# Patient Record
Sex: MALE | Race: WHITE | Employment: OTHER | URBAN - METROPOLITAN AREA
[De-identification: names, ages, dates, MRNs, and addresses within clinical notes are randomized per-mention and may not be internally consistent; named-entity substitution may affect disease eponyms.]

---

## 2017-07-27 ENCOUNTER — OFFICE VISIT (OUTPATIENT)
Dept: ORTHOPEDIC SURGERY | Age: 57
End: 2017-07-27

## 2017-07-27 VITALS
BODY MASS INDEX: 40.25 KG/M2 | DIASTOLIC BLOOD PRESSURE: 63 MMHG | HEART RATE: 61 BPM | SYSTOLIC BLOOD PRESSURE: 123 MMHG | WEIGHT: 205.03 LBS | HEIGHT: 60 IN

## 2017-07-27 DIAGNOSIS — M17.11 PRIMARY OSTEOARTHRITIS OF RIGHT KNEE: Primary | ICD-10-CM

## 2017-07-27 DIAGNOSIS — M25.561 RIGHT KNEE PAIN, UNSPECIFIED CHRONICITY: ICD-10-CM

## 2017-07-27 PROCEDURE — 20610 DRAIN/INJ JOINT/BURSA W/O US: CPT | Performed by: ORTHOPAEDIC SURGERY

## 2017-07-27 PROCEDURE — 3017F COLORECTAL CA SCREEN DOC REV: CPT | Performed by: ORTHOPAEDIC SURGERY

## 2017-07-27 PROCEDURE — G8427 DOCREV CUR MEDS BY ELIG CLIN: HCPCS | Performed by: ORTHOPAEDIC SURGERY

## 2017-07-27 PROCEDURE — 1036F TOBACCO NON-USER: CPT | Performed by: ORTHOPAEDIC SURGERY

## 2017-07-27 PROCEDURE — 99214 OFFICE O/P EST MOD 30 MIN: CPT | Performed by: ORTHOPAEDIC SURGERY

## 2017-07-27 PROCEDURE — G8419 CALC BMI OUT NRM PARAM NOF/U: HCPCS | Performed by: ORTHOPAEDIC SURGERY

## 2017-07-27 PROCEDURE — 73564 X-RAY EXAM KNEE 4 OR MORE: CPT | Performed by: ORTHOPAEDIC SURGERY

## 2017-07-27 RX ORDER — ACETAMINOPHEN 500 MG
TABLET ORAL
Refills: 0 | COMMUNITY
Start: 2017-07-19 | End: 2019-05-21 | Stop reason: ALTCHOICE

## 2017-07-27 RX ORDER — GABAPENTIN 600 MG/1
TABLET ORAL
Refills: 6 | COMMUNITY
Start: 2017-07-19 | End: 2018-09-04 | Stop reason: ALTCHOICE

## 2017-07-27 RX ORDER — MELOXICAM 15 MG/1
15 TABLET ORAL DAILY
Qty: 30 TABLET | Refills: 3 | Status: SHIPPED | OUTPATIENT
Start: 2017-07-27 | End: 2019-02-19

## 2017-08-02 ENCOUNTER — OFFICE VISIT (OUTPATIENT)
Dept: ORTHOPEDIC SURGERY | Age: 57
End: 2017-08-02

## 2017-08-02 VITALS
BODY MASS INDEX: 40.25 KG/M2 | WEIGHT: 205.03 LBS | DIASTOLIC BLOOD PRESSURE: 61 MMHG | SYSTOLIC BLOOD PRESSURE: 115 MMHG | HEIGHT: 60 IN | HEART RATE: 72 BPM

## 2017-08-02 DIAGNOSIS — M79.672 PAIN OF LEFT HEEL: Primary | ICD-10-CM

## 2017-08-02 DIAGNOSIS — M72.2 BILATERAL PLANTAR FASCIITIS: ICD-10-CM

## 2017-08-02 DIAGNOSIS — M79.671 PAIN OF RIGHT HEEL: ICD-10-CM

## 2017-08-02 PROBLEM — H26.9 CATARACT: Status: ACTIVE | Noted: 2017-08-02

## 2017-08-02 PROBLEM — R20.9 DISTURBANCE OF SKIN SENSATION: Status: ACTIVE | Noted: 2017-08-02

## 2017-08-02 PROBLEM — M25.562 PAIN IN LEFT KNEE: Status: ACTIVE | Noted: 2017-08-02

## 2017-08-02 PROBLEM — L97.911 NON-PRESSURE CHRONIC ULCER OF RIGHT LOWER LEG, LIMITED TO BREAKDOWN OF SKIN (HCC): Status: ACTIVE | Noted: 2017-08-02

## 2017-08-02 PROBLEM — N18.9 CHRONIC KIDNEY DISEASE: Status: ACTIVE | Noted: 2017-08-02

## 2017-08-02 PROBLEM — R07.1 PAINFUL RESPIRATION: Status: ACTIVE | Noted: 2017-08-02

## 2017-08-02 PROCEDURE — L3040 FT ARCH SUPRT PREMOLD LONGIT: HCPCS | Performed by: PODIATRIST

## 2017-08-02 PROCEDURE — 3017F COLORECTAL CA SCREEN DOC REV: CPT | Performed by: PODIATRIST

## 2017-08-02 PROCEDURE — G8427 DOCREV CUR MEDS BY ELIG CLIN: HCPCS | Performed by: PODIATRIST

## 2017-08-02 PROCEDURE — 73650 X-RAY EXAM OF HEEL: CPT | Performed by: PODIATRIST

## 2017-08-02 PROCEDURE — 99214 OFFICE O/P EST MOD 30 MIN: CPT | Performed by: PODIATRIST

## 2017-08-02 PROCEDURE — G8417 CALC BMI ABV UP PARAM F/U: HCPCS | Performed by: PODIATRIST

## 2017-08-02 PROCEDURE — 1036F TOBACCO NON-USER: CPT | Performed by: PODIATRIST

## 2017-08-02 RX ORDER — LOSARTAN POTASSIUM 100 MG/1
100 TABLET ORAL DAILY
COMMUNITY
Start: 2017-07-31 | End: 2021-08-17

## 2017-08-02 RX ORDER — PREDNISONE 10 MG/1
TABLET ORAL
Qty: 26 TABLET | Refills: 0 | Status: SHIPPED | OUTPATIENT
Start: 2017-08-02 | End: 2018-09-04

## 2017-08-24 ENCOUNTER — TELEPHONE (OUTPATIENT)
Dept: ORTHOPEDIC SURGERY | Age: 57
End: 2017-08-24

## 2017-09-07 ENCOUNTER — OFFICE VISIT (OUTPATIENT)
Dept: ORTHOPEDIC SURGERY | Age: 57
End: 2017-09-07

## 2017-09-07 DIAGNOSIS — M17.12 PRIMARY OSTEOARTHRITIS OF LEFT KNEE: Primary | ICD-10-CM

## 2017-09-07 PROCEDURE — G8417 CALC BMI ABV UP PARAM F/U: HCPCS | Performed by: ORTHOPAEDIC SURGERY

## 2017-09-07 PROCEDURE — 20610 DRAIN/INJ JOINT/BURSA W/O US: CPT | Performed by: ORTHOPAEDIC SURGERY

## 2017-09-07 PROCEDURE — L1812 KO ELASTIC W/JOINTS PRE OTS: HCPCS | Performed by: ORTHOPAEDIC SURGERY

## 2017-09-07 PROCEDURE — 1036F TOBACCO NON-USER: CPT | Performed by: ORTHOPAEDIC SURGERY

## 2017-09-07 PROCEDURE — 3017F COLORECTAL CA SCREEN DOC REV: CPT | Performed by: ORTHOPAEDIC SURGERY

## 2017-09-07 PROCEDURE — 99213 OFFICE O/P EST LOW 20 MIN: CPT | Performed by: ORTHOPAEDIC SURGERY

## 2017-09-07 PROCEDURE — G8427 DOCREV CUR MEDS BY ELIG CLIN: HCPCS | Performed by: ORTHOPAEDIC SURGERY

## 2017-09-14 ENCOUNTER — OFFICE VISIT (OUTPATIENT)
Dept: ORTHOPEDIC SURGERY | Age: 57
End: 2017-09-14

## 2017-09-14 DIAGNOSIS — M17.12 PRIMARY OSTEOARTHRITIS OF LEFT KNEE: Primary | ICD-10-CM

## 2017-09-14 PROCEDURE — 20610 DRAIN/INJ JOINT/BURSA W/O US: CPT | Performed by: ORTHOPAEDIC SURGERY

## 2017-09-14 PROCEDURE — 1036F TOBACCO NON-USER: CPT | Performed by: ORTHOPAEDIC SURGERY

## 2017-09-28 ENCOUNTER — OFFICE VISIT (OUTPATIENT)
Dept: ORTHOPEDIC SURGERY | Age: 57
End: 2017-09-28

## 2017-09-28 DIAGNOSIS — M17.12 PRIMARY OSTEOARTHRITIS OF LEFT KNEE: Primary | ICD-10-CM

## 2017-09-28 PROCEDURE — 1036F TOBACCO NON-USER: CPT | Performed by: ORTHOPAEDIC SURGERY

## 2017-09-28 PROCEDURE — 20610 DRAIN/INJ JOINT/BURSA W/O US: CPT | Performed by: ORTHOPAEDIC SURGERY

## 2017-10-16 RX ORDER — ISOSORBIDE MONONITRATE 30 MG/1
TABLET, EXTENDED RELEASE ORAL
Qty: 30 TABLET | Refills: 0 | Status: SHIPPED | OUTPATIENT
Start: 2017-10-16 | End: 2017-10-21 | Stop reason: SDUPTHER

## 2017-10-23 RX ORDER — ISOSORBIDE MONONITRATE 30 MG/1
TABLET, EXTENDED RELEASE ORAL
Qty: 30 TABLET | Refills: 0 | Status: SHIPPED | OUTPATIENT
Start: 2017-10-23 | End: 2018-10-09 | Stop reason: SDUPTHER

## 2017-11-15 RX ORDER — ISOSORBIDE MONONITRATE 30 MG/1
TABLET, EXTENDED RELEASE ORAL
Qty: 30 TABLET | Refills: 0 | Status: SHIPPED | OUTPATIENT
Start: 2017-11-15 | End: 2018-01-11 | Stop reason: SDUPTHER

## 2018-01-11 RX ORDER — ISOSORBIDE MONONITRATE 30 MG/1
TABLET, EXTENDED RELEASE ORAL
Qty: 90 TABLET | Refills: 3 | Status: SHIPPED | OUTPATIENT
Start: 2018-01-11 | End: 2018-07-06 | Stop reason: SDUPTHER

## 2018-07-06 ENCOUNTER — OFFICE VISIT (OUTPATIENT)
Dept: CARDIOLOGY CLINIC | Age: 58
End: 2018-07-06

## 2018-07-06 VITALS
DIASTOLIC BLOOD PRESSURE: 60 MMHG | HEART RATE: 53 BPM | HEIGHT: 60 IN | SYSTOLIC BLOOD PRESSURE: 108 MMHG | WEIGHT: 197.4 LBS | OXYGEN SATURATION: 94 % | BODY MASS INDEX: 38.76 KG/M2

## 2018-07-06 DIAGNOSIS — R06.09 DOE (DYSPNEA ON EXERTION): ICD-10-CM

## 2018-07-06 DIAGNOSIS — R07.9 EXERTIONAL CHEST PAIN: Primary | ICD-10-CM

## 2018-07-06 DIAGNOSIS — R09.89 BRUIT (ARTERIAL): ICD-10-CM

## 2018-07-06 DIAGNOSIS — E78.2 MIXED HYPERLIPIDEMIA: ICD-10-CM

## 2018-07-06 PROCEDURE — 3017F COLORECTAL CA SCREEN DOC REV: CPT | Performed by: INTERNAL MEDICINE

## 2018-07-06 PROCEDURE — 93000 ELECTROCARDIOGRAM COMPLETE: CPT | Performed by: INTERNAL MEDICINE

## 2018-07-06 PROCEDURE — G8427 DOCREV CUR MEDS BY ELIG CLIN: HCPCS | Performed by: INTERNAL MEDICINE

## 2018-07-06 PROCEDURE — 99214 OFFICE O/P EST MOD 30 MIN: CPT | Performed by: INTERNAL MEDICINE

## 2018-07-06 PROCEDURE — G8417 CALC BMI ABV UP PARAM F/U: HCPCS | Performed by: INTERNAL MEDICINE

## 2018-07-06 PROCEDURE — 1036F TOBACCO NON-USER: CPT | Performed by: INTERNAL MEDICINE

## 2018-07-06 RX ORDER — GABAPENTIN 800 MG/1
800 TABLET ORAL 3 TIMES DAILY
COMMUNITY
Start: 2018-06-19 | End: 2019-02-19

## 2018-07-06 RX ORDER — PIOGLITAZONEHYDROCHLORIDE 45 MG/1
45 TABLET ORAL DAILY
COMMUNITY
Start: 2018-04-12 | End: 2019-05-21 | Stop reason: ALTCHOICE

## 2018-07-06 RX ORDER — SIMVASTATIN 40 MG
TABLET ORAL
COMMUNITY
Start: 2018-04-12

## 2018-07-06 RX ORDER — GLIPIZIDE 10 MG/1
10 TABLET ORAL DAILY
COMMUNITY
Start: 2018-05-25

## 2018-07-06 NOTE — PROGRESS NOTES
4' 11.06\" (1.5 m)   Wt 197 lb 6.4 oz (89.5 kg)   SpO2 94%   BMI 39.79 kg/m²      General Appearance:  Alert, cooperative, no distress, appears stated age       Head:  Normocephalic, without obvious abnormality, atraumatic   Eyes:  PERRL, conjunctiva/corneas clear       Nose: Nares normal, no drainage or sinus tenderness   Throat: Lips, mucosa, and tongue normal       Neck: Supple, symmetrical, trachea midline, no adenopathy, thyroid: not enlarged, symmetric, no tenderness/mass/nodules, no carotid bruit or JVD       Lungs:   Clear to auscultation bilaterally, respirations unlabored   Chest Wall:  No tenderness or deformity       Heart: Regular rhythm and normal rate; S1, S2 are normal; 2/6  ESTELA at base, apex and LSB; no rub or gallop, bilateral carotid bruit       Abdomen:   Soft, non-tender, bowel sounds active all four quadrants,  no masses, no organomegaly       Extremities: Extremities normal, atraumatic, no cyanosis or edema. No deformities of hands. Pulses: 2+ and symmetric       Skin: Skin color, texture, turgor normal, no rashes or lesions       Pysch: Normal mood and affect.  Alert and oriented x3        Neurologic: Normal gross motor and sensory exam.       DIAGNOSTIC WORK UP:  Lab Data:  CBC:   Lab Results   Component Value Date    WBC 7.1 03/24/2015    HGB 13.2 03/24/2015    HCT 38.0 03/24/2015    MCV 95.4 03/24/2015     03/24/2015     BMP:   Lab Results   Component Value Date     03/24/2015    K 4.0 03/24/2015     03/24/2015    CO2 26 03/24/2015    PHOS 2.9 01/18/2011    BUN 32 03/24/2015    CREATININE 1.4 03/24/2015    CALCIUM 9.0 03/24/2015    GFRAA >60 03/24/2015    GFRAA >60 01/18/2011     LFTS:   Lab Results   Component Value Date    ALT 14 07/26/2014    AST 18 07/26/2014    ALKPHOS 52 07/26/2014    PROT 7.2 07/26/2014    AGRATIO 1.4 07/26/2014    BILITOT 0.5 07/26/2014     PT/INR:   No components found for: PTPATIENT,  PTINR  LIPID PANEL:   No components found for: nitrates. His stress test was normal in Sept 2016. Hs pain is anginal. EKG done in office today showed sinus bradycardia with no ischemic ST changes. He has risk factors including strong family for premature CAD. He has high pretest probability for CAD and will proceed with cardiac cath for further risk stratification.  - he will continue low dose Asa and Zocor.  - I will see him back after the procedure. If you have questions, please do not hesitate to call me. I look forward to following Ronette Hammans along with you.       Nick Anderson; MD, [facility] - [place], [place]  [phone] Jan Appl office  [phone] Main central  7/6/2018 3:50 PM

## 2018-07-10 ENCOUNTER — TELEPHONE (OUTPATIENT)
Dept: CARDIOLOGY | Age: 58
End: 2018-07-10

## 2018-07-19 ENCOUNTER — HOSPITAL ENCOUNTER (OUTPATIENT)
Age: 58
Discharge: HOME | End: 2018-07-19
Payer: MEDICARE

## 2018-07-19 ENCOUNTER — HOSPITAL ENCOUNTER (OUTPATIENT)
Dept: GENERAL RADIOLOGY | Age: 58
Discharge: HOME | End: 2018-07-19
Payer: MEDICARE

## 2018-07-19 DIAGNOSIS — R06.09 DYSPNEA ON EXERTION: ICD-10-CM

## 2018-07-19 PROCEDURE — 71046 X-RAY EXAM CHEST 2 VIEWS: CPT

## 2018-09-04 ENCOUNTER — OFFICE VISIT (OUTPATIENT)
Dept: PULMONOLOGY | Age: 58
End: 2018-09-04

## 2018-09-04 VITALS
BODY MASS INDEX: 38.79 KG/M2 | HEIGHT: 60 IN | DIASTOLIC BLOOD PRESSURE: 60 MMHG | WEIGHT: 197.6 LBS | TEMPERATURE: 98.8 F | OXYGEN SATURATION: 96 % | RESPIRATION RATE: 20 BRPM | HEART RATE: 58 BPM | SYSTOLIC BLOOD PRESSURE: 95 MMHG

## 2018-09-04 DIAGNOSIS — R06.09 DOE (DYSPNEA ON EXERTION): Primary | ICD-10-CM

## 2018-09-04 DIAGNOSIS — E66.9 OBESITY (BMI 35.0-39.9 WITHOUT COMORBIDITY): ICD-10-CM

## 2018-09-04 PROCEDURE — 1036F TOBACCO NON-USER: CPT | Performed by: INTERNAL MEDICINE

## 2018-09-04 PROCEDURE — 99204 OFFICE O/P NEW MOD 45 MIN: CPT | Performed by: INTERNAL MEDICINE

## 2018-09-04 PROCEDURE — 3017F COLORECTAL CA SCREEN DOC REV: CPT | Performed by: INTERNAL MEDICINE

## 2018-09-04 PROCEDURE — G8417 CALC BMI ABV UP PARAM F/U: HCPCS | Performed by: INTERNAL MEDICINE

## 2018-09-04 PROCEDURE — G8427 DOCREV CUR MEDS BY ELIG CLIN: HCPCS | Performed by: INTERNAL MEDICINE

## 2018-09-04 NOTE — LETTER
[facility] Pulmonary, Critical Care, and Sleep  [address], [address] [place] [address]  Phone: [phone]  Fax: [phone]    September 4, 2018     Patient: Elise Westbrook   MR Number: [id number]   YOB: 1960   Date of Visit: 9/4/2018     Dear Dr. Winn Aver: Thank you for the request for consultation for [place] to me for the evaluation of SOB. Below are the relevant portions of my assessment and plan of care. ASSESSMENT:  · OVERTON: etiology unclear presently could include lung or heart or deconditioning contributions  · Obesity  · DM2  · TBI as a child     PLAN:   · PFTs/6MWT  · F/u for results     If you have questions, please do not hesitate to call me. I look forward to following Collin Ingram along with you.     Sincerely,      Blanca May MD

## 2018-10-09 ENCOUNTER — OFFICE VISIT (OUTPATIENT)
Dept: PULMONOLOGY | Age: 58
End: 2018-10-09
Payer: MEDICARE

## 2018-10-09 ENCOUNTER — HOSPITAL ENCOUNTER (OUTPATIENT)
Dept: PULMONOLOGY | Age: 58
Discharge: HOME | End: 2018-10-09
Payer: MEDICARE

## 2018-10-09 VITALS
HEIGHT: 60 IN | OXYGEN SATURATION: 97 % | SYSTOLIC BLOOD PRESSURE: 122 MMHG | DIASTOLIC BLOOD PRESSURE: 78 MMHG | BODY MASS INDEX: 38.72 KG/M2 | RESPIRATION RATE: 18 BRPM | WEIGHT: 197.2 LBS | HEART RATE: 59 BPM

## 2018-10-09 DIAGNOSIS — E66.9 OBESITY (BMI 35.0-39.9 WITHOUT COMORBIDITY): ICD-10-CM

## 2018-10-09 DIAGNOSIS — R06.09 DOE (DYSPNEA ON EXERTION): ICD-10-CM

## 2018-10-09 DIAGNOSIS — R06.09 DOE (DYSPNEA ON EXERTION): Primary | ICD-10-CM

## 2018-10-09 PROCEDURE — G8417 CALC BMI ABV UP PARAM F/U: HCPCS | Performed by: INTERNAL MEDICINE

## 2018-10-09 PROCEDURE — 6360000002 HC RX W HCPCS: Performed by: INTERNAL MEDICINE

## 2018-10-09 PROCEDURE — 94729 DIFFUSING CAPACITY: CPT

## 2018-10-09 PROCEDURE — 94726 PLETHYSMOGRAPHY LUNG VOLUMES: CPT

## 2018-10-09 PROCEDURE — 1036F TOBACCO NON-USER: CPT | Performed by: INTERNAL MEDICINE

## 2018-10-09 PROCEDURE — 94664 DEMO&/EVAL PT USE INHALER: CPT

## 2018-10-09 PROCEDURE — G8484 FLU IMMUNIZE NO ADMIN: HCPCS | Performed by: INTERNAL MEDICINE

## 2018-10-09 PROCEDURE — 94640 AIRWAY INHALATION TREATMENT: CPT

## 2018-10-09 PROCEDURE — 3017F COLORECTAL CA SCREEN DOC REV: CPT | Performed by: INTERNAL MEDICINE

## 2018-10-09 PROCEDURE — 94618 PULMONARY STRESS TESTING: CPT

## 2018-10-09 PROCEDURE — 94060 EVALUATION OF WHEEZING: CPT

## 2018-10-09 PROCEDURE — G8427 DOCREV CUR MEDS BY ELIG CLIN: HCPCS | Performed by: INTERNAL MEDICINE

## 2018-10-09 PROCEDURE — 99213 OFFICE O/P EST LOW 20 MIN: CPT | Performed by: INTERNAL MEDICINE

## 2018-10-09 RX ORDER — ALBUTEROL SULFATE 2.5 MG/3ML
2.5 SOLUTION RESPIRATORY (INHALATION) ONCE
Status: COMPLETED | OUTPATIENT
Start: 2018-10-09 | End: 2018-10-09

## 2018-10-09 RX ADMIN — ALBUTEROL SULFATE 2.5 MG: 2.5 SOLUTION RESPIRATORY (INHALATION) at 11:38

## 2018-10-09 NOTE — PROCEDURES
[address]                [address] [address]                              PULMONARY FUNCTION    PATIENT NAME: Estelita Saul                     :         1960  MED REC NO:   [id number]                          ROOM:  ACCOUNT NO:   [de-identified]                           ADMIT DATE:  2018  PROVIDER:     Phillip Avila MD    DATE OF PROCEDURE:  10/09/2018    INDICATION: Dyspnea on exertion    FINDINGS:  1. Spirometry revealed  no evidence of obstructive defect. FEV1 is  2.91 liters, which is 118% of predicted. No significant response to  bronchodilators. FEV1/FVC ratio of 82%. 2.  Lung volume revealed normal total lung capacity 5.09 liters, which  is 96% of predicted. 3.  Diffusion capacity is moderately diffused at 18.54, which is 77%  of predicted. 4.  Flow volume loops appeared to be normal.    5.  A six-minute walk was done per [facility] protocol. The patient was able to walk 988 feet. Saturation on room air at rest  was 93% with a heart rate of 60. No significant desaturation on  exertion. Max heart rate 93. CONCLUSION:  1. No evidence of obstructive defect or restrictive defect. 2.  No bronchodilator response. 3.  Isolated mildly diffused diffusion capacity. 4.  A six minute walk with no significant desaturation.         Mercedez Wick MD    D: 10/09/2018 12:23:45       T: 10/09/2018 13:16:22     [email or web address]  Job#: [id number]     Doc#: [id number]    CC:

## 2018-10-10 RX ORDER — ISOSORBIDE MONONITRATE 30 MG/1
TABLET, EXTENDED RELEASE ORAL
Qty: 30 TABLET | Refills: 0 | Status: SHIPPED | OUTPATIENT
Start: 2018-10-10 | End: 2019-02-07 | Stop reason: SDUPTHER

## 2019-02-09 RX ORDER — ISOSORBIDE MONONITRATE 30 MG/1
TABLET, EXTENDED RELEASE ORAL
Qty: 15 TABLET | Refills: 0 | Status: SHIPPED | OUTPATIENT
Start: 2019-02-09 | End: 2019-03-03 | Stop reason: SDUPTHER

## 2019-02-19 ENCOUNTER — OFFICE VISIT (OUTPATIENT)
Dept: ORTHOPEDIC SURGERY | Age: 59
End: 2019-02-19
Payer: MEDICARE

## 2019-02-19 VITALS
BODY MASS INDEX: 38.69 KG/M2 | HEIGHT: 60 IN | WEIGHT: 197.09 LBS | DIASTOLIC BLOOD PRESSURE: 65 MMHG | SYSTOLIC BLOOD PRESSURE: 114 MMHG | HEART RATE: 58 BPM

## 2019-02-19 DIAGNOSIS — M17.12 PRIMARY OSTEOARTHRITIS OF LEFT KNEE: ICD-10-CM

## 2019-02-19 DIAGNOSIS — M25.562 ACUTE PAIN OF BOTH KNEES: Primary | ICD-10-CM

## 2019-02-19 DIAGNOSIS — M25.561 ACUTE PAIN OF BOTH KNEES: Primary | ICD-10-CM

## 2019-02-19 PROCEDURE — G8417 CALC BMI ABV UP PARAM F/U: HCPCS | Performed by: ORTHOPAEDIC SURGERY

## 2019-02-19 PROCEDURE — 99213 OFFICE O/P EST LOW 20 MIN: CPT | Performed by: ORTHOPAEDIC SURGERY

## 2019-02-19 PROCEDURE — G8427 DOCREV CUR MEDS BY ELIG CLIN: HCPCS | Performed by: ORTHOPAEDIC SURGERY

## 2019-02-19 PROCEDURE — 1036F TOBACCO NON-USER: CPT | Performed by: ORTHOPAEDIC SURGERY

## 2019-02-19 PROCEDURE — 3017F COLORECTAL CA SCREEN DOC REV: CPT | Performed by: ORTHOPAEDIC SURGERY

## 2019-02-19 PROCEDURE — G8484 FLU IMMUNIZE NO ADMIN: HCPCS | Performed by: ORTHOPAEDIC SURGERY

## 2019-02-19 RX ORDER — PENICILLIN V POTASSIUM 500 MG/1
TABLET ORAL
COMMUNITY
Start: 2019-02-18 | End: 2019-05-21

## 2019-02-27 ENCOUNTER — HOSPITAL ENCOUNTER (OUTPATIENT)
Dept: MRI IMAGING | Age: 59
Discharge: HOME | End: 2019-02-27
Payer: MEDICARE

## 2019-02-27 DIAGNOSIS — M25.562 ACUTE PAIN OF BOTH KNEES: ICD-10-CM

## 2019-02-27 DIAGNOSIS — M25.561 ACUTE PAIN OF BOTH KNEES: ICD-10-CM

## 2019-02-27 PROCEDURE — 73721 MRI JNT OF LWR EXTRE W/O DYE: CPT

## 2019-03-05 RX ORDER — ISOSORBIDE MONONITRATE 30 MG/1
TABLET, EXTENDED RELEASE ORAL
Qty: 10 TABLET | Refills: 0 | Status: SHIPPED | OUTPATIENT
Start: 2019-03-05 | End: 2021-08-17

## 2019-03-25 ENCOUNTER — OFFICE VISIT (OUTPATIENT)
Dept: ORTHOPEDIC SURGERY | Age: 59
End: 2019-03-25
Payer: MEDICARE

## 2019-03-25 DIAGNOSIS — M17.12 PRIMARY OSTEOARTHRITIS OF LEFT KNEE: Primary | ICD-10-CM

## 2019-03-25 PROCEDURE — 99213 OFFICE O/P EST LOW 20 MIN: CPT | Performed by: ORTHOPAEDIC SURGERY

## 2019-03-25 PROCEDURE — G8417 CALC BMI ABV UP PARAM F/U: HCPCS | Performed by: ORTHOPAEDIC SURGERY

## 2019-03-25 PROCEDURE — 3017F COLORECTAL CA SCREEN DOC REV: CPT | Performed by: ORTHOPAEDIC SURGERY

## 2019-03-25 PROCEDURE — G8427 DOCREV CUR MEDS BY ELIG CLIN: HCPCS | Performed by: ORTHOPAEDIC SURGERY

## 2019-03-25 PROCEDURE — G8484 FLU IMMUNIZE NO ADMIN: HCPCS | Performed by: ORTHOPAEDIC SURGERY

## 2019-03-25 PROCEDURE — 1036F TOBACCO NON-USER: CPT | Performed by: ORTHOPAEDIC SURGERY

## 2019-04-22 ENCOUNTER — OFFICE VISIT (OUTPATIENT)
Dept: ORTHOPEDIC SURGERY | Age: 59
End: 2019-04-22
Payer: MEDICARE

## 2019-04-22 VITALS — HEIGHT: 60 IN | WEIGHT: 184 LBS | BODY MASS INDEX: 36.12 KG/M2

## 2019-04-22 DIAGNOSIS — M17.12 PRIMARY OSTEOARTHRITIS OF LEFT KNEE: Primary | ICD-10-CM

## 2019-04-22 DIAGNOSIS — M25.562 LEFT KNEE PAIN, UNSPECIFIED CHRONICITY: ICD-10-CM

## 2019-04-22 PROCEDURE — 1036F TOBACCO NON-USER: CPT | Performed by: ORTHOPAEDIC SURGERY

## 2019-04-22 PROCEDURE — G8427 DOCREV CUR MEDS BY ELIG CLIN: HCPCS | Performed by: ORTHOPAEDIC SURGERY

## 2019-04-22 PROCEDURE — 3017F COLORECTAL CA SCREEN DOC REV: CPT | Performed by: ORTHOPAEDIC SURGERY

## 2019-04-22 PROCEDURE — L1830 KO IMMOB CANVAS LONG PRE OTS: HCPCS | Performed by: ORTHOPAEDIC SURGERY

## 2019-04-22 PROCEDURE — G8417 CALC BMI ABV UP PARAM F/U: HCPCS | Performed by: ORTHOPAEDIC SURGERY

## 2019-04-22 PROCEDURE — 99214 OFFICE O/P EST MOD 30 MIN: CPT | Performed by: ORTHOPAEDIC SURGERY

## 2019-04-22 NOTE — LETTER
Attention    These are medical screening labs, not pre-admission surgery labs. Via Janes Ross M.D.  [phone]  [address] [facility], [address] [facility]    Date:  2019    Name: Martha Berry    : 1960    Please take this form with you.       THE FOLLOWING LABS NEED TO BE COMPLETED:    _x__UA  _x__URINE C/S (THIS NEEDS TO BE DONE EVEN IF THE UA IS NORMAL)  _x__CBC W/ DIFF  _x__PT/INR  _x__PTT  _x__TRANSFERRIN  _x__ALBUMIN  _x__CHEM 7  _x__HEMAGLOBIN A1-C  ____OTHER: _____________________________________________                         Diagnosis:  LT KNEE OSTEOARTHRITIS            RT KNEE OA M17.11      LT KNEE OA M17.12         RT HIP OA  M16.11         LT HIP OA M16.12         RT SHLD OA  M19.011   LT SHLD OA  M19.012             Z01.812  (Pre-op examination code)      2019 11:20 AM  Radha Napier PA-C

## 2019-04-22 NOTE — PROGRESS NOTES
2015    CATARACT REMOVAL WITH IMPLANT Left 8/12/15    FRACTURE SURGERY      RIGHT ARM AS A CHILD    OTHER SURGICAL HISTORY  3/24/15    Excision Soft Tissue Mass on Forehead     Family History   Problem Relation Age of Onset    Diabetes Mother     Cancer Father         COLON CA     Social History     Socioeconomic History    Marital status: Single     Spouse name: None    Number of children: None    Years of education: None    Highest education level: None   Occupational History    None   Social Needs    Financial resource strain: None    Food insecurity:     Worry: None     Inability: None    Transportation needs:     Medical: None     Non-medical: None   Tobacco Use    Smoking status: Former Smoker     Packs/day: 2.00     Years: 20.00     Pack years: 40.00     Types: Cigarettes     Last attempt to quit: 1990     Years since quittin.3    Smokeless tobacco: Never Used   Substance and Sexual Activity    Alcohol use: No    Drug use: No    Sexual activity: Yes     Partners: Female   Lifestyle    Physical activity:     Days per week: None     Minutes per session: None    Stress: None   Relationships    Social connections:     Talks on phone: None     Gets together: None     Attends Sikh service: None     Active member of club or organization: None     Attends meetings of clubs or organizations: None     Relationship status: None    Intimate partner violence:     Fear of current or ex partner: None     Emotionally abused: None     Physically abused: None     Forced sexual activity: None   Other Topics Concern    None   Social History Narrative    None     Current Outpatient Medications   Medication Sig Dispense Refill    isosorbide mononitrate (IMDUR) 30 MG extended release tablet TAKE 1 TABLET BY MOUTH ONE TIME A DAY. needs appointment with office for further refills.   10 tablet 0    penicillin v potassium (VEETID) 500 MG tablet       metFORMIN (GLUCOPHAGE) 500 MG tablet Take 500 mg by mouth 2 times daily (with meals)       glipiZIDE (GLUCOTROL) 10 MG tablet Take 10 mg by mouth daily       pioglitazone (ACTOS) 45 MG tablet Take 45 mg by mouth daily       simvastatin (ZOCOR) 40 MG tablet       losartan (COZAAR) 100 MG tablet Take 100 mg by mouth daily       MAPAP 500 MG tablet TAKE 1 TABLET BY MOUTH EVERY EIGHT HOURS AS NEEDED  0    ibuprofen (ADVIL;MOTRIN) 600 MG tablet Take 1 tablet by mouth every 6 hours as needed for Pain 20 tablet 0    aspirin 81 MG EC tablet Take 81 mg by mouth daily. No current facility-administered medications for this visit. Medication Management  Patient has been treated with NSAIDs and Steroids with Minimal relief for 2 weeks. Review of Systems:  Relevant review of systems reviewed and available in the patient's chart    Vital Signs: There were no vitals filed for this visit. Body mass index is 35.94 kg/m². Limitation in Activities of Daily Living (ADLs)  The patient is able to ambulate with the assistance of cane for 6 - 10 steps  steps/feet. Walking distance less than 1 block    Patient needs assistance with activities of daily living bathing and cooking. [facility]: No     Safety Issues: Risk of falls  Patient is at risk for falls/fall history: Yes    General Exam:   Constitutional: Patient is adequately groomed with no evidence of malnutrition  DTRs: Deep tendon reflexes are intact  Mental Status: The patient is oriented to time, place and person. The patient's mood and affect are appropriate. Lymphatic: The lymphatic examination bilaterally reveals all areas to be without enlargement or induration. Vascular: Examination reveals no swelling or calf tenderness. Peripheral pulses are palpable and 2+. Neurological: The patient has good coordination. There is no weakness or sensory deficit. Left Knee Examination:    Inspection:  No erythema or signs of infection.   Positive weight loss, and activity modification for years with no improvement in  pain relief or function. Impression:  Encounter Diagnoses   Name Primary?  Left knee pain, unspecified chronicity     Primary osteoarthritis of left knee Yes       Office Procedures:  Orders Placed This Encounter   Procedures    XR KNEE LEFT (1-2 VIEWS)     Standing Status:   Future     Number of Occurrences:   1     Standing Expiration Date:   5/22/2019   Roseanna Kayser [facility] Physical Therapy     Referral Priority:   Routine     Referral Type:   Eval and Treat     Referral Reason:   Specialty Services Required     Requested Specialty:   Physical Therapy     Number of Visits Requested:   1       Treatment Plan:  I discussed with the patient the nature of osteoarthritis of the knee. We talked about treatment of arthritis and the various options that are involved with this. The patient understands that the treatments can vary from essentially doing nothing to a total joint replacement arthroplasty for arthritis. I then went on to describe the utilization of glucosamine and chondroitin sulfate as a joint nutrition product. We talked about the fact that this is essentially a joint vitamin with typically minimal side effects. We also talked about utilization of prescription over-the-counter anti-inflammatory medications as the next option. We also discussed the possibility of brace wear or orthotic wear if the patient has significant varus alignment. We then went on to discuss the possibility of Visco supplementation with hyaluronate products. We talked about the typical course of this type of treatment and the fact that often times in the treatment for significant arthritis, this is successful less than half the time.  We also talked about the corticosteroid injections and the fact that this can give a brief window of relief, but does not cure the problem; in fact, the pain often has a rebound effect in 6-10 weeks after the steroid has worn off. We also discussed arthroscopy surgery in attempts to debride the joint, but the fact that this is relatively unreliable treatment in the face of significant arthritis. It can occasionally be used, particularly if there is significant meniscus pathology. Lastly we discussed total joint replacement arthroplasty as the final and definitive step in treatment of arthritis. Patient realizes the magnitude of this type of treatment as well as having voiced a general understanding to the duration of the prosthesis. The patient voiced understanding to these continuum of treatment options. At this point the patient has failed conservative treatment as mentioned above. They would like to go ahead and proceed with a left total knee replacement with robotic assistance. This does require nondiagnostic CT scan for surgical planning. The patient is aware that this may or may not be covered by the insurance provider and would be an out-of-pocket expense. Patient will now participate in preoperative laboratory evaluation and physical therapy. Patient will participate in Orthovitals postoperatively. Patient is aware that he will need a history of physical within 30 days of the procedure. The patient is a diabetic with his last hemoglobin A1c of 5.9. We discussed the risk, benefits, and potential complications of knee replacement arthroplasty surgery. The patient voiced their understanding to concerns that include infection, deep vein thrombosis, neurological injury, and delayed rehabilitation. The patient also realizes that there are concerns regarding the potential need for manipulation under anesthesia if range of motion proves to be problematic. The patient also understands that there is always a chance of dystrophy and anesthetic complications that would include a stroke, cardiopulmonary pathology, and even death.   We also discussed the rehabilitation process involved with this operation and options that involved not only the hospitalization but outpatient physical therapy and independent home exercise program.  The patient also realizes the need for a knee brace and ambulatory aids in the rehabilitation process as well as the very significant role that the patient plays in terms of rehabilitation after this type of operation. The patient also understands that although the patient typically functional by 6-8 weeks postop that it may take 9 months to year for full recovery. All questions were answered.

## 2019-04-22 NOTE — LETTER
CONSENT TO SURGICAL OR MEDICAL PROCEDURE    PATIENTS NAMES: Jez Jackson 1960  59 y.o. [phone] (home)   DATE/TIME: 4/22/2019 11:19 AM    1) I consent that Dr. Meredith Castro perform one or more surgical and or medical procedures on the above named patient at: [address] [place] [address]/[facility] to treat the condition(s) which appear indicated by the diagnostic studies already preformed. I have been told in general terms the nature and purpose of the procedure(s) and what the procedure(s) is/are expected to accomplish. They procedure(s) are as follows:   LT TOTAL KNEE MAKOPLASTY     2) It has been explained to me by the informing physician that during the course of any surgical or medical procedure unforeseen condition(s) may be revealed that necessitate an extension of the original procedure(s) or a different procedure(s) than set forth in Paragraph 1. I therefore consent that the above named physician perform such additional or different procedure(s) as are necessary or desirable in the exercise of his professional judgement. 3) I have been made aware by the informing physician of certain reasonably known risks that are associated with the procedure(s) set forth in Paragraph 1.  I understand the reasonably known risk to be: Including but not limited to: CVA, infection, M.I., Phlebitis, Cardiac Arrest and Pulmonary Embolism, Loss of Circulation, Nerve Injury, Delayed Healing, Recurrence, Loss of extremity/digit, R.S.D., Screw breakage, Arthritis, Pain, Swelling, Stiffness, Failure of Prothesis, Fracture, Leg length discrepancy, Wound complication/non-healing, need for further surgery and persistent pain. 4) I have also been informed by the informing physician that there are other risks, from both known and unknown causes, that are attendant to the performance of any surgical or medical procedure(s).   I am aware that the practice of surgery and medicine is not an exact science, and I acknowledge that no guarantees have been made to me concerning the results of the procedure(s). 5) I consent to the taking of photographs before, during and after the procedure(s) for scientific and educational purposes. I also understand that medical students and residents may participate in the procedure(s) set forth in Paragraph 1, and I consent to their participation under the supervision of the above named physician. 6) I consent to the administration of anesthesia and to the use of such anesthetics as may be deemed advisable by the anesthesiologist engaged to administer anesthesia. 7) I certify that I have read and understand this consent to the surgical or medical procedure(s); that all the information contained herein was disclosed to me by the informing physician prior to my signing; that all blanks or statements requiring insertions or completion were filled in and inapplicable paragraphs, if any, were stricken before I signed; and that all questions asked by me about the procedure(s) have been fully answered by the informing physician in a satisfactory manner.    ________________________________                           _______________________________  Signature of patient                                                                  Mike Gillis M.D.  ________________________________                           ________________________________  Signature of Informing Physician                                           Informing Physician (Print)    If patient is unable to sign or is a minor, complete one of the following:   A) Patient is a minor ______________ years of age.    B) Patient is unable to sign because_________________________________________________ The undersigned represents that he or she is duly authorized to execute to this consent for and on the behalf of the above named patient.    ________________________________             __________________________________________  Witness                                                                         Parent/Spouse/Guardian/Other:_________________    Medical Record#  Insurance  Smartphone:  Yes   Or   No  Email:                 You have signed a consent to have a total joint replacement surgery performed. Before you can proceed with surgery the following things must be done. Please use this form as a checklist.      _____   Please schedule your Physical Therapy functional evaluation. At the location on your script.    _____   Please take your lab orders and get your blood work done at a [facility].    _____  Royce Ortiz will need to go to [facility] to complete registration and the medical questionnaire prior to your physical therapy evaluation. Do not schedule an appointment with your primary care physician until you have a surgery date. This pre-op exam has to be within 30 days of the surgery. _____  CT Scans will be scheduled by our office.    _____  Information about the pre-op class will be in your surgery packet that will be mailed to you after you are scheduled for surgery. Once you have completed both the labs and the evaluation please call Lizette Carroll @ [phone] to let her know. Once verification of the PT Evaluation and completed labs has been determined you will be called and set up for surgery. This may take 1-2 days to check results and return your phone call.

## 2019-04-29 DIAGNOSIS — M25.562 LEFT KNEE PAIN, UNSPECIFIED CHRONICITY: Primary | ICD-10-CM

## 2019-05-02 ENCOUNTER — TELEPHONE (OUTPATIENT)
Dept: ORTHOPEDIC SURGERY | Age: 59
End: 2019-05-02

## 2019-05-02 NOTE — TELEPHONE ENCOUNTER
ORTHOPAEDIC NURSE NAVIGATOR SUMMARY NOTE  Sister Jennifer answered all questions for pt. States he doesn't understand a lot of medical things. He doesn't read or write. Anticipated Date of Surgery: 5/28/19    Using [facility]oVitals? Yes, Are they Registered:  yes   If No, why not? N/A    Attended Pre-Op Education Class: no   If No, why not? Pt Declined      PCP: ANTONIO Parker CNP   Phone #: [phone]    Date of PCP Visit for H&P:5/20/19    Any Noted Concerns from PCP prior to surgery:  No   If Yes, what concerns? :- pending cardiac clearance        IS THE PATIENT IN A PAIN MANAGEMENT PROGRAM?:   Not Applicable         Review of Past Medical History Reveals History of:      Critical Lab Values:   Hgb/Hct:   Hemoglobin (g/dL)   Date Value   03/24/2015 13.2 (L)   /  Hematocrit (%)   Date Value   03/24/2015 38.0 (L)      HgbA1C:    Lab Results   Component Value Date    LABA1C 5.9 07/26/2014      Albumin:    Lab Results   Component Value Date    LABALBU 4.2 07/26/2014      BUN/Cr:   BUN (mg/dL)   Date Value   03/24/2015 32 (H)   /  CREATININE (mg/dL)   Date Value   03/24/2015 1.4 (H)      BMI:    BMI Readings from Last 1 Encounters:   04/22/19 35.94 kg/m²        Coronary Artery Disease/HTN/CHF History: Yes- HTN      Cardiologist: Dr. Hamzah Avery    Cardiac Clearance Necessary:Yes    Date of Cardiac Clearance Appt: 5/22/19- echo, stress test, and myoscan all normal.     On Plavix? No,  If YES, when will they stop taking?     Final Cardiac Recommendations:N/A   -On any anticoagulation-NONE       Diabetes History: Yes    Most Recent HgbA1C: 5.9    PCP or Endocrine Recommendations: N/A    Nutritionist/Dietician Consult Scheduled: N/A    Final Plan For Diabetic Control: N/A   Pulmonary: COPD/Emphysema/ Use of home oxygen: NONE     Alcohol use:NONE           DVT Risk Stratification:  Unknown      Vascular Consult Ordered:  NA    Date of Vascular Appt:     Hematology/Oncology Consult Ordered:  NA    Date of Hematology/Oncology Appt:     Final Recommendation For DVT Prophylaxis:   -Smoking history or use of estrogen-Quit 30 years ago. BMI Greater than 40 at time of scheduling?: No    Has Surgeon been notified of BMI concern? Not Applicable    Weight Loss Clinic Consult Ordered: Not Applicable    Date of Wt Loss Clinic Appt:     BMI at time of surgery (if went through [facility]): Additional Medical Concerns:     None    Additional Recommendations for above concerns:      Discharge Disposition Information:     Attended Pre-Hab Program: no to schedule      Anticipated Discharge Disposition:  Home    Who will be with patient at home following discharge? Pt to stay with sister. He has home across the street and lives with mother. Equipment pt already has: Will need equipment     Bedroom on first or second floor: Bi level home.     Bathroom on first or second floor: Main level    Weight bearing status: Full    Pre-op ambulatory status: none    Number of entry steps: 2 steps   Caregiver assistance: Full time        Yesenia Posey  5/2/2019

## 2019-05-08 PROBLEM — Z01.810 PRE-OPERATIVE CARDIOVASCULAR EXAMINATION: Status: ACTIVE | Noted: 2019-05-08

## 2019-05-14 ENCOUNTER — HOSPITAL ENCOUNTER (OUTPATIENT)
Dept: CT IMAGING | Age: 59
Discharge: HOME | End: 2019-05-14
Payer: MEDICARE

## 2019-05-14 DIAGNOSIS — M25.562 LEFT KNEE PAIN, UNSPECIFIED CHRONICITY: ICD-10-CM

## 2019-05-14 PROCEDURE — 73700 CT LOWER EXTREMITY W/O DYE: CPT

## 2019-05-21 PROBLEM — E78.2 MIXED HYPERLIPIDEMIA: Status: ACTIVE | Noted: 2019-05-21

## 2019-05-21 PROBLEM — I10 ESSENTIAL HYPERTENSION: Status: ACTIVE | Noted: 2019-05-21

## 2019-05-21 RX ORDER — ACETAMINOPHEN 500 MG
1 TABLET ORAL
COMMUNITY
Start: 2018-02-21 | End: 2019-05-21

## 2019-05-21 RX ORDER — PIOGLITAZONEHYDROCHLORIDE 45 MG/1
1 TABLET ORAL DAILY
COMMUNITY
Start: 2018-10-16

## 2019-05-21 NOTE — PROGRESS NOTES
Obstructive Sleep Apnea (BI) Screening     Patient:  Benjamin Iyer    YOB: 1960      Medical Record #:  [id number]                     Date:  5/21/2019     1. Are you a loud and/or regular snorer? []  Yes       [x] No    2. Have you been observed to gasp or stop breathing during sleep? []  Yes       [x] No    3. Do you feel tired or groggy upon awakening or do you awaken with a headache?           []  Yes       [x] No    4. Are you often tired or fatigued during the wake time hours? []  Yes       [x] No    5. Do you fall asleep sitting, reading, watching TV or driving? []  Yes       [x] No    6. Do you often have problems with memory or concentration? []  Yes       [x] No    **If patient's score is ? 3 they are considered high risk for BI. Notify the anesthesiologist of the high risk and document in focus note. Note:  If the patient's BMI is more than 35 kg m¯² , has neck circumference > 40 cm, and/or high blood pressure the risk is greater (© American Sleep Apnea Association, 2006).

## 2019-05-21 NOTE — PROGRESS NOTES
about 29 years ago. His smoking use included cigarettes. He has a 40.00 pack-year smoking history. He has never used smokeless tobacco. He reports that he does not drink alcohol or use drugs. Family History:  family history includes Asthma in his sister; Cancer in his father; Diabetes in his father and mother; Heart Disease in his mother; Stroke in his mother. Mother stents for CAD, sister stents for CAD      Home Medications:  Were reviewed and are listed in nursing record and/or below  Prior to Admission medications    Medication Sig Start Date End Date Taking? Authorizing Provider   pioglitazone (ACTOS) 45 MG tablet Take 1 tablet by mouth daily 10/16/18  Yes Historical Provider, MD   isosorbide mononitrate (IMDUR) 30 MG extended release tablet TAKE 1 TABLET BY MOUTH ONE TIME A DAY. needs appointment with office for further refills. 3/5/19  Yes Ernestine Duque MD   metFORMIN (GLUCOPHAGE) 500 MG tablet Take 500 mg by mouth 2 times daily (with meals)  4/12/18  Yes Historical Provider, MD   glipiZIDE (GLUCOTROL) 10 MG tablet Take 10 mg by mouth daily  5/25/18  Yes Historical Provider, MD   simvastatin (ZOCOR) 40 MG tablet  4/12/18  Yes Historical Provider, MD   losartan (COZAAR) 100 MG tablet Take 100 mg by mouth daily  7/31/17  Yes Historical Provider, MD   aspirin 81 MG EC tablet Take 81 mg by mouth daily Indications: stopped 5/20    Yes Historical Provider, MD          Allergies:  Patient has no known allergies. Review of Systems:   A 14 point review of symptoms completed. Pertinent positives identified in the HPI, all other review of symptoms negative as below.       Objective   PHYSICAL EXAM:    Vitals:    05/22/19 1342   BP: 110/60   Pulse: 56   SpO2: 98%    Weight: 182 lb 6.4 oz (82.7 kg)         General Appearance:  Alert, cooperative, no distress, appears stated age   Head:  Normocephalic, without obvious abnormality, atraumatic   Eyes:  PERRL, conjunctiva/corneas clear   Nose: Nares normal, no tricuspid regurgitation. Systolic pulmonary artery pressure (SPAP) is normal and estimated at 25 mmHg   (RA pressure 3 mmHg). Stress Test: 01/30/15   Summary  There is normal isotope uptake at stress and rest. There is no evidence of  myocardial ischemia or scar. There are no regional wall motion abnormalities. Normal left ventricular systolic function with ejection fraction of 73 %. Normal myocardial perfusion study. Cath:  Abdominal US: 1/28/15   No evidence of AAA     Carotid Doppler: 2/4/15  1. No significant stenosis in the internal carotid arteries bilaterally. 2. Normal vertebral and subclavian arteries bilaterally. SILVERIO 11/1/16  There is no arterial insufficiency at rest involving the lower extremities  bilaterally       Studies:       I have reviewed labs and imaging/xray/diagnostic testing in this note. Assessment        Patient Active Problem List   Diagnosis    Sprain of ankle    Chest pain    SOB (shortness of breath)    Primary osteoarthritis of left knee    Chronic kidney disease    Painful respiration    Cataract    Non-pressure chronic ulcer of right lower leg, limited to breakdown of skin (HCC)    Disturbance of skin sensation    Pain in left knee    Pre-operative cardiovascular examination    Essential hypertension    Mixed hyperlipidemia                Plan   1. Danish Gaucher for pre-operative clearance and chest pain   2. Echo to evaluate for pre-operative clearance and chest pain   3. Carotid doppler for bruit  4. Cardiac clearance dependent on testing  5. Follow up with NP in 2-3 months      Scribe's attestation: This note was scribed in the presence of Dr. Jorden Bray by Alexi Gary RN      Thank you for allowing us to participate in the care of Chris Duque. Please call me with any questions [phone].     Jorden Bray MD, [facility] [place]   Interventional Cardiologist  [facility]  [phone] [facility]  [phone] [facility] Office  5/22/2019 2:02 PM    I will address the patient's cardiac risk factors and adjusted pharmacologic treatment as needed. In addition, I have reinforced the need for patient directed risk factor modification. Tobacco use was discussed with the patient and educated on the negative effects and was asked not to use. All questions and concerns were addressed to the patient/family. Alternatives to my treatment were discussed. I, Dr Magui Oates, personally performed the services described in this documentation, as scribed by the above signed scribe in my presence. It is both accurate and complete to my knowledge. I agree with the details independently gathered by the clinical support staff and the scribed note accurately describes my personal service to the patient.

## 2019-05-22 ENCOUNTER — OFFICE VISIT (OUTPATIENT)
Dept: CARDIOLOGY CLINIC | Age: 59
End: 2019-05-22
Payer: MEDICARE

## 2019-05-22 ENCOUNTER — HOSPITAL ENCOUNTER (OUTPATIENT)
Dept: PREADMISSION TESTING | Age: 59
Discharge: HOME | End: 2019-05-26
Payer: MEDICARE

## 2019-05-22 VITALS
BODY MASS INDEX: 35.81 KG/M2 | DIASTOLIC BLOOD PRESSURE: 60 MMHG | WEIGHT: 182.4 LBS | HEIGHT: 60 IN | OXYGEN SATURATION: 98 % | HEART RATE: 56 BPM | SYSTOLIC BLOOD PRESSURE: 110 MMHG

## 2019-05-22 DIAGNOSIS — I10 ESSENTIAL HYPERTENSION: ICD-10-CM

## 2019-05-22 DIAGNOSIS — R09.89 BRUIT: ICD-10-CM

## 2019-05-22 DIAGNOSIS — Z01.818 PRE-OPERATIVE CLEARANCE: ICD-10-CM

## 2019-05-22 DIAGNOSIS — R07.2 PRECORDIAL PAIN: Primary | ICD-10-CM

## 2019-05-22 DIAGNOSIS — Z01.810 PRE-OPERATIVE CARDIOVASCULAR EXAMINATION: ICD-10-CM

## 2019-05-22 DIAGNOSIS — E78.2 MIXED HYPERLIPIDEMIA: ICD-10-CM

## 2019-05-22 LAB
ABO/RH: NORMAL
ANTIBODY SCREEN: NORMAL
APTT: 34.2 SEC (ref 26–36)
BILIRUBIN URINE: NEGATIVE
BLOOD, URINE: ABNORMAL
CLARITY: CLEAR
COLOR: YELLOW
GLUCOSE URINE: NEGATIVE MG/DL
INR BLD: 1.01 (ref 0.86–1.14)
KETONES, URINE: NEGATIVE MG/DL
LEUKOCYTE ESTERASE, URINE: NEGATIVE
MICROSCOPIC EXAMINATION: YES
NITRITE, URINE: NEGATIVE
PH UA: 5.5 (ref 5–8)
PROTEIN UA: NEGATIVE MG/DL
PROTHROMBIN TIME: 11.5 SEC (ref 9.8–13)
RBC UA: ABNORMAL /HPF (ref 0–2)
SPECIFIC GRAVITY UA: 1.02 (ref 1–1.03)
TRANSFERRIN: 222 MG/DL (ref 200–360)
URINE TYPE: ABNORMAL
UROBILINOGEN, URINE: 0.2 E.U./DL
WBC UA: ABNORMAL /HPF (ref 0–5)

## 2019-05-22 PROCEDURE — 86901 BLOOD TYPING SEROLOGIC RH(D): CPT

## 2019-05-22 PROCEDURE — 99214 OFFICE O/P EST MOD 30 MIN: CPT | Performed by: INTERNAL MEDICINE

## 2019-05-22 PROCEDURE — 36415 COLL VENOUS BLD VENIPUNCTURE: CPT

## 2019-05-22 PROCEDURE — G8427 DOCREV CUR MEDS BY ELIG CLIN: HCPCS | Performed by: INTERNAL MEDICINE

## 2019-05-22 PROCEDURE — 1036F TOBACCO NON-USER: CPT | Performed by: INTERNAL MEDICINE

## 2019-05-22 PROCEDURE — 81001 URINALYSIS AUTO W/SCOPE: CPT

## 2019-05-22 PROCEDURE — 85730 THROMBOPLASTIN TIME PARTIAL: CPT

## 2019-05-22 PROCEDURE — 3017F COLORECTAL CA SCREEN DOC REV: CPT | Performed by: INTERNAL MEDICINE

## 2019-05-22 PROCEDURE — G8417 CALC BMI ABV UP PARAM F/U: HCPCS | Performed by: INTERNAL MEDICINE

## 2019-05-22 PROCEDURE — 93000 ELECTROCARDIOGRAM COMPLETE: CPT | Performed by: INTERNAL MEDICINE

## 2019-05-22 PROCEDURE — 86850 RBC ANTIBODY SCREEN: CPT

## 2019-05-22 PROCEDURE — 87086 URINE CULTURE/COLONY COUNT: CPT

## 2019-05-22 PROCEDURE — 84466 ASSAY OF TRANSFERRIN: CPT

## 2019-05-22 PROCEDURE — 85610 PROTHROMBIN TIME: CPT

## 2019-05-22 PROCEDURE — 86900 BLOOD TYPING SEROLOGIC ABO: CPT

## 2019-05-22 NOTE — PATIENT INSTRUCTIONS
1. Chris Whitlock for pre-operative clearance and chest pain   2. Echo to evaluate for pre-operative clearance and chest pain   3. Carotid doppler for bruit  4. Cardiac clearance dependent on testing  5.  Follow up with NP in 2-3 months

## 2019-05-23 ENCOUNTER — HOSPITAL ENCOUNTER (OUTPATIENT)
Dept: CARDIOLOGY | Age: 59
Discharge: HOME | End: 2019-05-23
Payer: MEDICARE

## 2019-05-23 VITALS — HEIGHT: 60 IN | WEIGHT: 182 LBS | BODY MASS INDEX: 35.73 KG/M2

## 2019-05-23 DIAGNOSIS — R07.2 PRECORDIAL PAIN: ICD-10-CM

## 2019-05-23 DIAGNOSIS — Z01.810 PRE-OPERATIVE CARDIOVASCULAR EXAMINATION: ICD-10-CM

## 2019-05-23 LAB
LV EF: 69 %
LVEF MODALITY: NORMAL

## 2019-05-23 PROCEDURE — A9502 TC99M TETROFOSMIN: HCPCS | Performed by: INTERNAL MEDICINE

## 2019-05-23 PROCEDURE — 3430000000 HC RX DIAGNOSTIC RADIOPHARMACEUTICAL: Performed by: INTERNAL MEDICINE

## 2019-05-23 PROCEDURE — 78452 HT MUSCLE IMAGE SPECT MULT: CPT

## 2019-05-23 PROCEDURE — 93017 CV STRESS TEST TRACING ONLY: CPT

## 2019-05-23 PROCEDURE — 6360000002 HC RX W HCPCS: Performed by: INTERNAL MEDICINE

## 2019-05-23 RX ADMIN — TETROFOSMIN 10.8 MILLICURIE: 1.38 INJECTION, POWDER, LYOPHILIZED, FOR SOLUTION INTRAVENOUS at 12:44

## 2019-05-23 RX ADMIN — REGADENOSON 0.4 MG: 0.08 INJECTION, SOLUTION INTRAVENOUS at 13:27

## 2019-05-23 RX ADMIN — TETROFOSMIN 32.5 MILLICURIE: 1.38 INJECTION, POWDER, LYOPHILIZED, FOR SOLUTION INTRAVENOUS at 13:33

## 2019-05-24 ENCOUNTER — ANESTHESIA EVENT (OUTPATIENT)
Dept: OPERATING ROOM | Age: 59
End: 2019-05-24
Payer: MEDICARE

## 2019-05-24 ENCOUNTER — TELEPHONE (OUTPATIENT)
Dept: CARDIOLOGY CLINIC | Age: 59
End: 2019-05-24

## 2019-05-24 LAB — URINE CULTURE, ROUTINE: NORMAL

## 2019-05-24 NOTE — TELEPHONE ENCOUNTER
----- Message from Ernestine Duque MD sent at 5/23/2019  6:32 PM EDT -----  Let patient know their stress test is normal, continue current meds, no new orders or changes at this time. Thanks.

## 2019-05-24 NOTE — TELEPHONE ENCOUNTER
Let patient know echo test shows normal heart function, no new orders or changes at this time.  Thanks. Created telephone encounter. Per Pt HIPAA from can leave results on machine. LMOM relaying message per physician. Pt to call the office with any concerns.

## 2019-05-27 ENCOUNTER — HOSPITAL ENCOUNTER (EMERGENCY)
Age: 59
Discharge: HOME | End: 2019-05-27
Attending: EMERGENCY MEDICINE
Payer: MEDICARE

## 2019-05-27 ENCOUNTER — APPOINTMENT (OUTPATIENT)
Dept: GENERAL RADIOLOGY | Age: 59
End: 2019-05-27
Payer: MEDICARE

## 2019-05-27 VITALS
RESPIRATION RATE: 4 BRPM | TEMPERATURE: 97.3 F | SYSTOLIC BLOOD PRESSURE: 115 MMHG | WEIGHT: 182 LBS | HEART RATE: 56 BPM | BODY MASS INDEX: 35.73 KG/M2 | HEIGHT: 60 IN | DIASTOLIC BLOOD PRESSURE: 65 MMHG | OXYGEN SATURATION: 98 %

## 2019-05-27 DIAGNOSIS — R42 DIZZINESS: Primary | ICD-10-CM

## 2019-05-27 LAB
ANION GAP SERPL CALCULATED.3IONS-SCNC: 12 MMOL/L (ref 3–16)
BUN BLDV-MCNC: 22 MG/DL (ref 7–20)
CALCIUM SERPL-MCNC: 9.7 MG/DL (ref 8.3–10.6)
CHLORIDE BLD-SCNC: 102 MMOL/L (ref 99–110)
CO2: 25 MMOL/L (ref 21–32)
CREAT SERPL-MCNC: 1.1 MG/DL (ref 0.8–1.3)
GFR AFRICAN AMERICAN: >60
GFR NON-AFRICAN AMERICAN: >60
GLUCOSE BLD-MCNC: 147 MG/DL (ref 70–99)
HCT VFR BLD CALC: 40.2 % (ref 40.5–52.5)
HEMOGLOBIN: 13.5 G/DL (ref 13.5–17.5)
MCH RBC QN AUTO: 32.5 PG (ref 26–34)
MCHC RBC AUTO-ENTMCNC: 33.7 G/DL (ref 31–36)
MCV RBC AUTO: 96.4 FL (ref 80–100)
PDW BLD-RTO: 13.6 % (ref 12.4–15.4)
PLATELET # BLD: 161 K/UL (ref 135–450)
PMV BLD AUTO: 8 FL (ref 5–10.5)
POTASSIUM REFLEX MAGNESIUM: 4.1 MMOL/L (ref 3.5–5.1)
RBC # BLD: 4.17 M/UL (ref 4.2–5.9)
SODIUM BLD-SCNC: 139 MMOL/L (ref 136–145)
TROPONIN: <0.01 NG/ML
WBC # BLD: 7.1 K/UL (ref 4–11)

## 2019-05-27 PROCEDURE — 85027 COMPLETE CBC AUTOMATED: CPT

## 2019-05-27 PROCEDURE — 99284 EMERGENCY DEPT VISIT MOD MDM: CPT

## 2019-05-27 PROCEDURE — 80048 BASIC METABOLIC PNL TOTAL CA: CPT

## 2019-05-27 PROCEDURE — 84484 ASSAY OF TROPONIN QUANT: CPT

## 2019-05-27 PROCEDURE — 96361 HYDRATE IV INFUSION ADD-ON: CPT

## 2019-05-27 PROCEDURE — 2580000003 HC RX 258: Performed by: EMERGENCY MEDICINE

## 2019-05-27 PROCEDURE — 93005 ELECTROCARDIOGRAM TRACING: CPT | Performed by: EMERGENCY MEDICINE

## 2019-05-27 PROCEDURE — 6370000000 HC RX 637 (ALT 250 FOR IP): Performed by: EMERGENCY MEDICINE

## 2019-05-27 PROCEDURE — 96360 HYDRATION IV INFUSION INIT: CPT

## 2019-05-27 PROCEDURE — 71045 X-RAY EXAM CHEST 1 VIEW: CPT

## 2019-05-27 RX ORDER — ONDANSETRON 4 MG/1
4 TABLET, ORALLY DISINTEGRATING ORAL ONCE
Status: COMPLETED | OUTPATIENT
Start: 2019-05-27 | End: 2019-05-27

## 2019-05-27 RX ORDER — 0.9 % SODIUM CHLORIDE 0.9 %
1000 INTRAVENOUS SOLUTION INTRAVENOUS ONCE
Status: COMPLETED | OUTPATIENT
Start: 2019-05-27 | End: 2019-05-27

## 2019-05-27 RX ADMIN — SODIUM CHLORIDE 1000 ML: 9 INJECTION, SOLUTION INTRAVENOUS at 19:56

## 2019-05-27 RX ADMIN — ONDANSETRON 4 MG: 4 TABLET, ORALLY DISINTEGRATING ORAL at 19:56

## 2019-05-27 NOTE — ED PROVIDER NOTES
Smokeless tobacco: Never Used   Substance and Sexual Activity    Alcohol use: No    Drug use: No    Sexual activity: Yes     Partners: Female   Lifestyle    Physical activity:     Days per week: None     Minutes per session: None    Stress: None   Relationships    Social connections:     Talks on phone: None     Gets together: None     Attends Latter day service: None     Active member of club or organization: None     Attends meetings of clubs or organizations: None     Relationship status: None    Intimate partner violence:     Fear of current or ex partner: None     Emotionally abused: None     Physically abused: None     Forced sexual activity: None   Other Topics Concern    None   Social History Narrative    None       Medications/Allergies     Previous Medications    ASPIRIN 81 MG EC TABLET    Take 81 mg by mouth daily Indications: stopped 5/20     GLIPIZIDE (GLUCOTROL) 10 MG TABLET    Take 10 mg by mouth daily     ISOSORBIDE MONONITRATE (IMDUR) 30 MG EXTENDED RELEASE TABLET    TAKE 1 TABLET BY MOUTH ONE TIME A DAY. needs appointment with office for further refills. LOSARTAN (COZAAR) 100 MG TABLET    Take 100 mg by mouth daily     METFORMIN (GLUCOPHAGE) 500 MG TABLET    Take 500 mg by mouth 2 times daily (with meals)     PIOGLITAZONE (ACTOS) 45 MG TABLET    Take 1 tablet by mouth daily    SIMVASTATIN (ZOCOR) 40 MG TABLET         No Known Allergies     Physical Exam       ED Triage Vitals [05/27/19 1905]   BP Temp Temp Source Pulse Resp SpO2 Height Weight   133/81 97.3 °F (36.3 °C) Oral 60 18 98 % 5' (1.524 m) 182 lb (82.6 kg)     GENERAL APPEARANCE: Awake and alert. Cooperative. No acute distress. HEAD: Normocephalic. Atraumatic. EYES: Sclera anicteric. ENT: Tolerates saliva. No trismus. NECK: Supple. Trachea midline. CARDIO: RRR. Radial pulse 2+. LUNGS: Respirations unlabored. CTAB. ABDOMEN: Soft. Non-distended. Non-tender. EXTREMITIES: No acute deformities. SKIN: Warm and dry. NEUROLOGICAL: No gross facial drooping. Moves all 4 extremities spontaneously. PSYCHIATRIC: Normal mood. Diagnostics   Labs:  Results for orders placed or performed during the hospital encounter of 05/27/19   CBC   Result Value Ref Range    WBC 7.1 4.0 - 11.0 K/uL    RBC 4.17 (L) 4.20 - 5.90 M/uL    Hemoglobin 13.5 13.5 - 17.5 g/dL    Hematocrit 40.2 (L) 40.5 - 52.5 %    MCV 96.4 80.0 - 100.0 fL    MCH 32.5 26.0 - 34.0 pg    MCHC 33.7 31.0 - 36.0 g/dL    RDW 13.6 12.4 - 15.4 %    Platelets 147 644 - 137 K/uL    MPV 8.0 5.0 - 10.5 fL   Basic Metabolic Panel w/ Reflex to MG   Result Value Ref Range    Sodium 139 136 - 145 mmol/L    Potassium reflex Magnesium 4.1 3.5 - 5.1 mmol/L    Chloride 102 99 - 110 mmol/L    CO2 25 21 - 32 mmol/L    Anion Gap 12 3 - 16    Glucose 147 (H) 70 - 99 mg/dL    BUN 22 (H) 7 - 20 mg/dL    CREATININE 1.1 0.8 - 1.3 mg/dL    GFR Non-African American >60 >60    GFR African American >60 >60    Calcium 9.7 8.3 - 10.6 mg/dL   Troponin   Result Value Ref Range    Troponin <0.01 <0.01 ng/mL   EKG 12 Lead   Result Value Ref Range    Ventricular Rate 58 BPM    Atrial Rate 58 BPM    P-R Interval 128 ms    QRS Duration 92 ms    Q-T Interval 462 ms    QTc Calculation (Bazett) 453 ms    P Axis 33 degrees    R Axis 46 degrees    T Axis 52 degrees    Diagnosis       Sinus bradycardiaOtherwise normal ECGWhen compared with ECG of 13-AUG-2007 21:09,No significant change was found     Radiographs:  Xr Chest Portable    Result Date: 5/27/2019  EXAMINATION: ONE XRAY VIEW OF THE CHEST 5/27/2019 7:24 pm COMPARISON: 07/19/2018 HISTORY: ORDERING SYSTEM PROVIDED HISTORY: dizzy TECHNOLOGIST PROVIDED HISTORY: Reason for exam:->dizzy Ordering Physician Provided Reason for Exam: Dizziness (pt states that he has been dizzy and weak x 1 day. pt states he has not had his imdur x 1 month. pt states n/v x 1 day.) Acuity: Acute Type of Exam: Initial FINDINGS: The lungs are without acute focal process.   There is no effusion or pneumothorax. The cardiomediastinal silhouette is stable. The osseous structures are stable. No acute process. Procedures/EKG:   Sinus bradycardia, 58/m no ischemia no ectopy. ED Course and MDM   In brief, Cesar Gentile is a 59 y.o. male who presented to the emergency department some dizziness with standing primarily. He was hydrated left electrolytes were stable. Troponin was normal. He ambulated subsequently his color was improved and discharged in stable condition to feel his have some dehydration. Discharged in stable condition    ED Medication Orders (From admission, onward)    Start Ordered     Status Ordering Provider    05/27/19 1945 05/27/19 1931  0.9 % sodium chloride bolus  ONCE      Last MAR action:  Stopped - by Marie Chau on 05/27/19 at [facility]    05/27/19 1945 05/27/19 1931  ondansetron (ZOFRAN-ODT) disintegrating tablet 4 mg  ONCE      Last MAR action:  Given - by Marie Chau on 05/27/19 at [address]          Final Impression      1.  Dizziness      DISPOSITION Decision To Discharge 05/27/2019 09:59:15 PM     (Please note that portions of this note may have been completed with a voice recognition program. Efforts were made to edit the dictations but occasionally words are mis-transcribed.)    Aravind Lee MD  [address] MD  05/27/19 2625

## 2019-05-28 ENCOUNTER — ANESTHESIA (OUTPATIENT)
Dept: OPERATING ROOM | Age: 59
End: 2019-05-28
Payer: MEDICARE

## 2019-05-28 ENCOUNTER — HOSPITAL ENCOUNTER (OUTPATIENT)
Age: 59
Discharge: HOME | End: 2019-05-29
Attending: ORTHOPAEDIC SURGERY | Admitting: ORTHOPAEDIC SURGERY
Payer: MEDICARE

## 2019-05-28 VITALS — DIASTOLIC BLOOD PRESSURE: 60 MMHG | OXYGEN SATURATION: 100 % | SYSTOLIC BLOOD PRESSURE: 92 MMHG

## 2019-05-28 DIAGNOSIS — Z96.652 STATUS POST TOTAL LEFT KNEE REPLACEMENT: Primary | ICD-10-CM

## 2019-05-28 DIAGNOSIS — M17.12 PRIMARY OSTEOARTHRITIS OF LEFT KNEE: ICD-10-CM

## 2019-05-28 LAB
ABO/RH: NORMAL
ANTIBODY SCREEN: NORMAL
EKG ATRIAL RATE: 58 BPM
EKG DIAGNOSIS: NORMAL
EKG P AXIS: 33 DEGREES
EKG P-R INTERVAL: 128 MS
EKG Q-T INTERVAL: 462 MS
EKG QRS DURATION: 92 MS
EKG QTC CALCULATION (BAZETT): 453 MS
EKG R AXIS: 46 DEGREES
EKG T AXIS: 52 DEGREES
EKG VENTRICULAR RATE: 58 BPM
GLUCOSE BLD-MCNC: 128 MG/DL (ref 70–99)
GLUCOSE BLD-MCNC: 134 MG/DL (ref 70–99)
GLUCOSE BLD-MCNC: 262 MG/DL (ref 70–99)
PERFORMED ON: ABNORMAL

## 2019-05-28 PROCEDURE — 88305 TISSUE EXAM BY PATHOLOGIST: CPT

## 2019-05-28 PROCEDURE — 3600000015 HC SURGERY LEVEL 5 ADDTL 15MIN: Performed by: ORTHOPAEDIC SURGERY

## 2019-05-28 PROCEDURE — 3700000001 HC ADD 15 MINUTES (ANESTHESIA): Performed by: ORTHOPAEDIC SURGERY

## 2019-05-28 PROCEDURE — 6360000002 HC RX W HCPCS: Performed by: PHYSICIAN ASSISTANT

## 2019-05-28 PROCEDURE — 2709999900 HC NON-CHARGEABLE SUPPLY: Performed by: ORTHOPAEDIC SURGERY

## 2019-05-28 PROCEDURE — 2580000003 HC RX 258: Performed by: ANESTHESIOLOGY

## 2019-05-28 PROCEDURE — 6370000000 HC RX 637 (ALT 250 FOR IP): Performed by: PHYSICIAN ASSISTANT

## 2019-05-28 PROCEDURE — 2500000003 HC RX 250 WO HCPCS: Performed by: NURSE ANESTHETIST, CERTIFIED REGISTERED

## 2019-05-28 PROCEDURE — 86900 BLOOD TYPING SEROLOGIC ABO: CPT

## 2019-05-28 PROCEDURE — 86901 BLOOD TYPING SEROLOGIC RH(D): CPT

## 2019-05-28 PROCEDURE — C1776 JOINT DEVICE (IMPLANTABLE): HCPCS | Performed by: ORTHOPAEDIC SURGERY

## 2019-05-28 PROCEDURE — 3600000005 HC SURGERY LEVEL 5 BASE: Performed by: ORTHOPAEDIC SURGERY

## 2019-05-28 PROCEDURE — C9290 INJ, BUPIVACAINE LIPOSOME: HCPCS | Performed by: ORTHOPAEDIC SURGERY

## 2019-05-28 PROCEDURE — 2580000003 HC RX 258: Performed by: PHYSICIAN ASSISTANT

## 2019-05-28 PROCEDURE — 2720000010 HC SURG SUPPLY STERILE: Performed by: ORTHOPAEDIC SURGERY

## 2019-05-28 PROCEDURE — 6360000002 HC RX W HCPCS: Performed by: NURSE ANESTHETIST, CERTIFIED REGISTERED

## 2019-05-28 PROCEDURE — 7100000000 HC PACU RECOVERY - FIRST 15 MIN: Performed by: ORTHOPAEDIC SURGERY

## 2019-05-28 PROCEDURE — 6360000002 HC RX W HCPCS: Performed by: ORTHOPAEDIC SURGERY

## 2019-05-28 PROCEDURE — 3700000000 HC ANESTHESIA ATTENDED CARE: Performed by: ORTHOPAEDIC SURGERY

## 2019-05-28 PROCEDURE — 97530 THERAPEUTIC ACTIVITIES: CPT

## 2019-05-28 PROCEDURE — 6370000000 HC RX 637 (ALT 250 FOR IP): Performed by: ANESTHESIOLOGY

## 2019-05-28 PROCEDURE — 7100000001 HC PACU RECOVERY - ADDTL 15 MIN: Performed by: ORTHOPAEDIC SURGERY

## 2019-05-28 PROCEDURE — 88311 DECALCIFY TISSUE: CPT

## 2019-05-28 PROCEDURE — 97110 THERAPEUTIC EXERCISES: CPT

## 2019-05-28 PROCEDURE — 2580000003 HC RX 258: Performed by: ORTHOPAEDIC SURGERY

## 2019-05-28 PROCEDURE — 97161 PT EVAL LOW COMPLEX 20 MIN: CPT

## 2019-05-28 PROCEDURE — 93010 ELECTROCARDIOGRAM REPORT: CPT | Performed by: INTERNAL MEDICINE

## 2019-05-28 PROCEDURE — 97535 SELF CARE MNGMENT TRAINING: CPT

## 2019-05-28 PROCEDURE — 86850 RBC ANTIBODY SCREEN: CPT

## 2019-05-28 PROCEDURE — 97165 OT EVAL LOW COMPLEX 30 MIN: CPT

## 2019-05-28 PROCEDURE — 2500000003 HC RX 250 WO HCPCS: Performed by: ORTHOPAEDIC SURGERY

## 2019-05-28 PROCEDURE — 1200000000 HC SEMI PRIVATE

## 2019-05-28 DEVICE — IMPLANTABLE DEVICE: Type: IMPLANTABLE DEVICE | Site: KNEE | Status: FUNCTIONAL

## 2019-05-28 DEVICE — BASEPLATE TIB SZ 4 AP46MM ML70MM KNEE TRITANIUM 4 CRUCFRM: Type: IMPLANTABLE DEVICE | Site: KNEE | Status: FUNCTIONAL

## 2019-05-28 DEVICE — INSERT TIB SZ 4 THK9MM KNEE X3 CNDYL STBL BEAR TECHNOLOGY: Type: IMPLANTABLE DEVICE | Site: KNEE | Status: FUNCTIONAL

## 2019-05-28 DEVICE — COMPONENT PAT DIA31MM THK9MM KNEE TRITANIUM MTL BK: Type: IMPLANTABLE DEVICE | Site: PATELLA | Status: FUNCTIONAL

## 2019-05-28 RX ORDER — LIDOCAINE HYDROCHLORIDE 10 MG/ML
1 INJECTION, SOLUTION EPIDURAL; INFILTRATION; INTRACAUDAL; PERINEURAL
Status: DISCONTINUED | OUTPATIENT
Start: 2019-05-28 | End: 2019-05-28 | Stop reason: HOSPADM

## 2019-05-28 RX ORDER — OXYCODONE HYDROCHLORIDE AND ACETAMINOPHEN 5; 325 MG/1; MG/1
1 TABLET ORAL EVERY 4 HOURS PRN
Status: DISCONTINUED | OUTPATIENT
Start: 2019-05-28 | End: 2019-05-29 | Stop reason: HOSPADM

## 2019-05-28 RX ORDER — SIMVASTATIN 40 MG
40 TABLET ORAL NIGHTLY
Status: DISCONTINUED | OUTPATIENT
Start: 2019-05-28 | End: 2019-05-29 | Stop reason: HOSPADM

## 2019-05-28 RX ORDER — MORPHINE SULFATE 2 MG/ML
2 INJECTION, SOLUTION INTRAMUSCULAR; INTRAVENOUS
Status: DISCONTINUED | OUTPATIENT
Start: 2019-05-28 | End: 2019-05-29 | Stop reason: HOSPADM

## 2019-05-28 RX ORDER — CYCLOBENZAPRINE HCL 10 MG
10 TABLET ORAL 3 TIMES DAILY PRN
Status: DISCONTINUED | OUTPATIENT
Start: 2019-05-28 | End: 2019-05-29 | Stop reason: HOSPADM

## 2019-05-28 RX ORDER — SODIUM CHLORIDE 0.9 % (FLUSH) 0.9 %
10 SYRINGE (ML) INJECTION PRN
Status: DISCONTINUED | OUTPATIENT
Start: 2019-05-28 | End: 2019-05-28 | Stop reason: HOSPADM

## 2019-05-28 RX ORDER — NICOTINE POLACRILEX 4 MG
15 LOZENGE BUCCAL PRN
Status: DISCONTINUED | OUTPATIENT
Start: 2019-05-28 | End: 2019-05-29 | Stop reason: HOSPADM

## 2019-05-28 RX ORDER — TRANEXAMIC ACID 100 MG/ML
INJECTION, SOLUTION INTRAVENOUS PRN
Status: DISCONTINUED | OUTPATIENT
Start: 2019-05-28 | End: 2019-05-28 | Stop reason: SDUPTHER

## 2019-05-28 RX ORDER — BUPIVACAINE HYDROCHLORIDE 7.5 MG/ML
INJECTION, SOLUTION INTRASPINAL PRN
Status: DISCONTINUED | OUTPATIENT
Start: 2019-05-28 | End: 2019-05-28 | Stop reason: SDUPTHER

## 2019-05-28 RX ORDER — SODIUM CHLORIDE, SODIUM LACTATE, POTASSIUM CHLORIDE, CALCIUM CHLORIDE 600; 310; 30; 20 MG/100ML; MG/100ML; MG/100ML; MG/100ML
INJECTION, SOLUTION INTRAVENOUS CONTINUOUS
Status: DISCONTINUED | OUTPATIENT
Start: 2019-05-28 | End: 2019-05-28

## 2019-05-28 RX ORDER — CYCLOBENZAPRINE HCL 10 MG
10 TABLET ORAL ONCE
Status: COMPLETED | OUTPATIENT
Start: 2019-05-28 | End: 2019-05-28

## 2019-05-28 RX ORDER — EPHEDRINE SULFATE 50 MG/ML
INJECTION INTRAVENOUS PRN
Status: DISCONTINUED | OUTPATIENT
Start: 2019-05-28 | End: 2019-05-28 | Stop reason: SDUPTHER

## 2019-05-28 RX ORDER — MORPHINE SULFATE 4 MG/ML
4 INJECTION, SOLUTION INTRAMUSCULAR; INTRAVENOUS
Status: DISCONTINUED | OUTPATIENT
Start: 2019-05-28 | End: 2019-05-29 | Stop reason: HOSPADM

## 2019-05-28 RX ORDER — SODIUM CHLORIDE, SODIUM LACTATE, POTASSIUM CHLORIDE, CALCIUM CHLORIDE 600; 310; 30; 20 MG/100ML; MG/100ML; MG/100ML; MG/100ML
INJECTION, SOLUTION INTRAVENOUS CONTINUOUS
Status: DISCONTINUED | OUTPATIENT
Start: 2019-05-28 | End: 2019-05-29 | Stop reason: HOSPADM

## 2019-05-28 RX ORDER — FAMOTIDINE 20 MG/1
20 TABLET, FILM COATED ORAL 2 TIMES DAILY
Status: DISCONTINUED | OUTPATIENT
Start: 2019-05-28 | End: 2019-05-29 | Stop reason: HOSPADM

## 2019-05-28 RX ORDER — ISOSORBIDE MONONITRATE 30 MG/1
30 TABLET, EXTENDED RELEASE ORAL DAILY
Status: DISCONTINUED | OUTPATIENT
Start: 2019-05-28 | End: 2019-05-29 | Stop reason: HOSPADM

## 2019-05-28 RX ORDER — SODIUM CHLORIDE 0.9 % (FLUSH) 0.9 %
10 SYRINGE (ML) INJECTION EVERY 12 HOURS SCHEDULED
Status: DISCONTINUED | OUTPATIENT
Start: 2019-05-28 | End: 2019-05-29 | Stop reason: HOSPADM

## 2019-05-28 RX ORDER — SODIUM CHLORIDE 0.9 % (FLUSH) 0.9 %
10 SYRINGE (ML) INJECTION EVERY 12 HOURS SCHEDULED
Status: DISCONTINUED | OUTPATIENT
Start: 2019-05-28 | End: 2019-05-28 | Stop reason: HOSPADM

## 2019-05-28 RX ORDER — DEXTROSE MONOHYDRATE 50 MG/ML
100 INJECTION, SOLUTION INTRAVENOUS PRN
Status: DISCONTINUED | OUTPATIENT
Start: 2019-05-28 | End: 2019-05-29 | Stop reason: HOSPADM

## 2019-05-28 RX ORDER — LIDOCAINE HYDROCHLORIDE 10 MG/ML
0.3 INJECTION, SOLUTION EPIDURAL; INFILTRATION; INTRACAUDAL; PERINEURAL
Status: DISCONTINUED | OUTPATIENT
Start: 2019-05-28 | End: 2019-05-28 | Stop reason: HOSPADM

## 2019-05-28 RX ORDER — OXYCODONE HYDROCHLORIDE AND ACETAMINOPHEN 5; 325 MG/1; MG/1
2 TABLET ORAL EVERY 4 HOURS PRN
Status: DISCONTINUED | OUTPATIENT
Start: 2019-05-28 | End: 2019-05-29 | Stop reason: HOSPADM

## 2019-05-28 RX ORDER — MIDAZOLAM HYDROCHLORIDE 1 MG/ML
INJECTION INTRAMUSCULAR; INTRAVENOUS PRN
Status: DISCONTINUED | OUTPATIENT
Start: 2019-05-28 | End: 2019-05-28 | Stop reason: SDUPTHER

## 2019-05-28 RX ORDER — GLIPIZIDE 5 MG/1
10 TABLET ORAL DAILY
Status: DISCONTINUED | OUTPATIENT
Start: 2019-05-28 | End: 2019-05-29 | Stop reason: HOSPADM

## 2019-05-28 RX ORDER — DEXAMETHASONE SODIUM PHOSPHATE 10 MG/ML
INJECTION INTRAMUSCULAR; INTRAVENOUS PRN
Status: DISCONTINUED | OUTPATIENT
Start: 2019-05-28 | End: 2019-05-28 | Stop reason: SDUPTHER

## 2019-05-28 RX ORDER — ONDANSETRON 2 MG/ML
4 INJECTION INTRAMUSCULAR; INTRAVENOUS EVERY 6 HOURS PRN
Status: DISCONTINUED | OUTPATIENT
Start: 2019-05-28 | End: 2019-05-29 | Stop reason: HOSPADM

## 2019-05-28 RX ORDER — DEXTROSE MONOHYDRATE 25 G/50ML
12.5 INJECTION, SOLUTION INTRAVENOUS PRN
Status: DISCONTINUED | OUTPATIENT
Start: 2019-05-28 | End: 2019-05-29 | Stop reason: HOSPADM

## 2019-05-28 RX ORDER — ONDANSETRON 2 MG/ML
INJECTION INTRAMUSCULAR; INTRAVENOUS PRN
Status: DISCONTINUED | OUTPATIENT
Start: 2019-05-28 | End: 2019-05-28 | Stop reason: SDUPTHER

## 2019-05-28 RX ORDER — PROPOFOL 10 MG/ML
INJECTION, EMULSION INTRAVENOUS CONTINUOUS PRN
Status: DISCONTINUED | OUTPATIENT
Start: 2019-05-28 | End: 2019-05-28 | Stop reason: SDUPTHER

## 2019-05-28 RX ORDER — SODIUM CHLORIDE 0.9 % (FLUSH) 0.9 %
10 SYRINGE (ML) INJECTION PRN
Status: DISCONTINUED | OUTPATIENT
Start: 2019-05-28 | End: 2019-05-29 | Stop reason: HOSPADM

## 2019-05-28 RX ORDER — PIOGLITAZONEHYDROCHLORIDE 30 MG/1
45 TABLET ORAL DAILY
Status: DISCONTINUED | OUTPATIENT
Start: 2019-05-28 | End: 2019-05-29 | Stop reason: HOSPADM

## 2019-05-28 RX ORDER — ACETAMINOPHEN 500 MG
1000 TABLET ORAL ONCE
Status: COMPLETED | OUTPATIENT
Start: 2019-05-28 | End: 2019-05-28

## 2019-05-28 RX ORDER — LOSARTAN POTASSIUM 100 MG/1
100 TABLET ORAL DAILY
Status: DISCONTINUED | OUTPATIENT
Start: 2019-05-28 | End: 2019-05-29 | Stop reason: HOSPADM

## 2019-05-28 RX ORDER — DOCUSATE SODIUM 100 MG/1
100 CAPSULE, LIQUID FILLED ORAL 2 TIMES DAILY
Status: DISCONTINUED | OUTPATIENT
Start: 2019-05-28 | End: 2019-05-29 | Stop reason: HOSPADM

## 2019-05-28 RX ADMIN — SODIUM CHLORIDE, POTASSIUM CHLORIDE, SODIUM LACTATE AND CALCIUM CHLORIDE: 600; 310; 30; 20 INJECTION, SOLUTION INTRAVENOUS at 10:37

## 2019-05-28 RX ADMIN — Medication 2 G: at 19:37

## 2019-05-28 RX ADMIN — EPHEDRINE SULFATE 10 MG: 50 INJECTION INTRAVENOUS at 11:35

## 2019-05-28 RX ADMIN — CYCLOBENZAPRINE HYDROCHLORIDE 10 MG: 10 TABLET, FILM COATED ORAL at 10:36

## 2019-05-28 RX ADMIN — FAMOTIDINE 20 MG: 20 TABLET, FILM COATED ORAL at 19:37

## 2019-05-28 RX ADMIN — SIMVASTATIN 40 MG: 40 TABLET, FILM COATED ORAL at 19:38

## 2019-05-28 RX ADMIN — ONDANSETRON 4 MG: 2 INJECTION INTRAMUSCULAR; INTRAVENOUS at 12:24

## 2019-05-28 RX ADMIN — PROPOFOL 120 MCG/KG/MIN: 10 INJECTION, EMULSION INTRAVENOUS at 11:12

## 2019-05-28 RX ADMIN — LOSARTAN POTASSIUM 100 MG: 100 TABLET, FILM COATED ORAL at 16:53

## 2019-05-28 RX ADMIN — SODIUM CHLORIDE, POTASSIUM CHLORIDE, SODIUM LACTATE AND CALCIUM CHLORIDE: 600; 310; 30; 20 INJECTION, SOLUTION INTRAVENOUS at 19:38

## 2019-05-28 RX ADMIN — BUPIVACAINE HYDROCHLORIDE 1.6 ML: 7.5 INJECTION, SOLUTION SUBARACHNOID at 11:07

## 2019-05-28 RX ADMIN — GLIPIZIDE 10 MG: 5 TABLET ORAL at 16:53

## 2019-05-28 RX ADMIN — METFORMIN HYDROCHLORIDE 500 MG: 500 TABLET ORAL at 16:53

## 2019-05-28 RX ADMIN — INSULIN LISPRO 2 UNITS: 100 INJECTION, SOLUTION INTRAVENOUS; SUBCUTANEOUS at 21:35

## 2019-05-28 RX ADMIN — SODIUM CHLORIDE, POTASSIUM CHLORIDE, SODIUM LACTATE AND CALCIUM CHLORIDE: 600; 310; 30; 20 INJECTION, SOLUTION INTRAVENOUS at 12:01

## 2019-05-28 RX ADMIN — TRANEXAMIC ACID 1000 MG: 100 INJECTION, SOLUTION INTRAVENOUS at 11:12

## 2019-05-28 RX ADMIN — OXYCODONE HYDROCHLORIDE AND ACETAMINOPHEN 2 TABLET: 5; 325 TABLET ORAL at 23:55

## 2019-05-28 RX ADMIN — Medication 2 G: at 11:12

## 2019-05-28 RX ADMIN — SODIUM CHLORIDE, POTASSIUM CHLORIDE, SODIUM LACTATE AND CALCIUM CHLORIDE: 600; 310; 30; 20 INJECTION, SOLUTION INTRAVENOUS at 11:00

## 2019-05-28 RX ADMIN — ACETAMINOPHEN 1000 MG: 500 TABLET ORAL at 10:36

## 2019-05-28 RX ADMIN — PIOGLITAZONE 45 MG: 30 TABLET ORAL at 16:53

## 2019-05-28 RX ADMIN — OXYCODONE HYDROCHLORIDE AND ACETAMINOPHEN 1 TABLET: 5; 325 TABLET ORAL at 16:53

## 2019-05-28 RX ADMIN — ISOSORBIDE MONONITRATE 30 MG: 30 TABLET, EXTENDED RELEASE ORAL at 16:53

## 2019-05-28 RX ADMIN — EPHEDRINE SULFATE 10 MG: 50 INJECTION INTRAVENOUS at 12:23

## 2019-05-28 RX ADMIN — EPHEDRINE SULFATE 15 MG: 50 INJECTION INTRAVENOUS at 11:45

## 2019-05-28 RX ADMIN — PHENYLEPHRINE HYDROCHLORIDE 100 MCG: 10 INJECTION INTRAVENOUS at 12:42

## 2019-05-28 RX ADMIN — DEXAMETHASONE SODIUM PHOSPHATE 10 MG: 10 INJECTION INTRAMUSCULAR; INTRAVENOUS at 12:24

## 2019-05-28 RX ADMIN — PHENYLEPHRINE HYDROCHLORIDE 200 MCG: 10 INJECTION INTRAVENOUS at 12:37

## 2019-05-28 RX ADMIN — EPHEDRINE SULFATE 15 MG: 50 INJECTION INTRAVENOUS at 11:41

## 2019-05-28 RX ADMIN — OXYCODONE HYDROCHLORIDE AND ACETAMINOPHEN 1 TABLET: 5; 325 TABLET ORAL at 19:37

## 2019-05-28 RX ADMIN — MIDAZOLAM HYDROCHLORIDE 2 MG: 2 INJECTION, SOLUTION INTRAMUSCULAR; INTRAVENOUS at 10:57

## 2019-05-28 ASSESSMENT — PULMONARY FUNCTION TESTS
PIF_VALUE: 0
PIF_VALUE: 1
PIF_VALUE: 0
PIF_VALUE: 1
PIF_VALUE: 0
PIF_VALUE: 1
PIF_VALUE: 0

## 2019-05-28 ASSESSMENT — PAIN SCALES - GENERAL
PAINLEVEL_OUTOF10: 5
PAINLEVEL_OUTOF10: 6
PAINLEVEL_OUTOF10: 2
PAINLEVEL_OUTOF10: 8
PAINLEVEL_OUTOF10: 6

## 2019-05-28 ASSESSMENT — PAIN DESCRIPTION - PAIN TYPE: TYPE: SURGICAL PAIN

## 2019-05-28 ASSESSMENT — ENCOUNTER SYMPTOMS: SHORTNESS OF BREATH: 1

## 2019-05-28 ASSESSMENT — PAIN DESCRIPTION - FREQUENCY: FREQUENCY: CONTINUOUS

## 2019-05-28 ASSESSMENT — PAIN - FUNCTIONAL ASSESSMENT: PAIN_FUNCTIONAL_ASSESSMENT: 0-10

## 2019-05-28 ASSESSMENT — PAIN DESCRIPTION - DESCRIPTORS: DESCRIPTORS: ACHING

## 2019-05-28 ASSESSMENT — PAIN DESCRIPTION - LOCATION: LOCATION: KNEE

## 2019-05-28 ASSESSMENT — PAIN DESCRIPTION - ORIENTATION: ORIENTATION: LEFT

## 2019-05-28 NOTE — PROGRESS NOTES
treatment    Orientation  Orientation  Overall Orientation Status: Within Normal Limits     Social/Functional History  Social/Functional History  Lives With: Family(mother)  Type of Home: House  Home Layout: One level  Home Access: Ramped entrance, Stairs to enter without rails(ramp through back entrance, 1 PAVEL without handrails through front entrance)  Bathroom Shower/Tub: Tub/Shower unit  Bathroom Toilet: Standard  Bathroom Equipment: Grab bars in [address]: Grab bars, Crutches, 4 wheeled walker  Receives Help From: Family  ADL Assistance: Independent  Homemaking Assistance: Independent(shares with family and mows lawn himself)  Ambulation Assistance: Independent(typically without AD, recently using one crutch due to knee pain)  Transfer Assistance: Independent  Active : No  Mode of Transportation: Family  Education: Sisters  Type of occupation: Pt does not work outside the home  [address]: 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 Pt plans to D/C home to sister's home (her home is 1-story, has 2 PAVEL without handrails, tub/shower with grab bar and shower seat, and a standard-height toilet).   Pt's sister can provide 24-hr sup/assist.    Objective  Observation/Palpation  Posture: Good    RLE AROM: WFL  LLE General AROM: WFL hip & ankle, L knee AAROM = 0-86 degrees  Strength RLE: WFL  Strength LLE: Not formally strength tested due to recent TKR; appears at least 3/5 in hip & ankle through observation    Bed mobility  Supine to Sit: Contact guard assistance(moving toward R side first and then later toward L side, HOB elevated ~45 degrees)  Scooting: Contact guard assistance(to scoot forward to EOB)     Transfers  Sit to Stand: 2 Person Assistance(assist ranging from Erik x 1 to modA x 2 depending on knee stability during transfer (2 reps performed from EOB to std. walker))  Stand to sit: Minimal Assistance  Bed to Chair: 2 Person Assistance(assist ranging from Erik x 1 to modA x 2 depending on knee stability during transfer, using std. walker)     Ambulation  Ambulation?: No(unable to safely attempt given knee joint instability (likely from numbness from nerve block) and difficulty with transfers)  WB Status: WBAT LLE with KI     Balance  Posture: Good  Sitting - Static: Good  Sitting - Dynamic: Poor  Standing - Static: Fair;-  Standing - Dynamic: Fair;-  Comments: Pt able to sit EOB for several minutes, largely requiring close SBA for static sitting balance. While donning underwear (see OT note for details), pt experienced 2 anterior LOB's (requiring mod-maxA from therapist to avoid full LOB/potential fall). Exercises  Straight Leg Raise: x 10 LLE (I)  Quad Sets: x 10 LLE  Gluteal Sets: x 10 bilat  Hip Abduction: x 10 LLE with CGA  Knee Short Arc Quad: x 10 LLE (I)  Knee Active Range of Motion: L knee AAROM = 0-86 degrees   Ankle Pumps: x 10 BLE  Comments: Pt agreeable to sit up in chair post-PT to allow L knee to dangle into flexion for ROM. Plan   Times per week: BID 7x/week  Times per day: Twice a day  Specific instructions for Next Treatment: Progress ther ex and mobility as tolerated, assess stair amb prior to D/C home  Current Treatment Recommendations: Strengthening, Gait Training, Stair training, ROM, Balance Training, Functional Mobility Training, Transfer Training, Safety Education & Training, Patient/Caregiver Education & Training, Equipment Evaluation, Education, & procurement  Safety Devices:  All fall risk precautions in place, Left in chair, Call light within reach, Chair alarm in place, Nurse notified, Gait belt, Patient at risk for falls    AM-PAC Score  AM-PAC Inpatient Mobility Raw Score : 14  AM-PAC Inpatient T-Scale Score : 38.1  Mobility Inpatient CMS 0-100% Score: 61.29  Mobility Inpatient CMS G-Code Modifier : CL    Goals  Short term goals  Time Frame for Short term goals: 2 days (5/30/19), unless otherwise specified  Short term goal 1: Pt will transfer supine <-> sit with modified(I)  Short term goal 2: Pt will transfer sit <-> stand and bed>chair using std. walker with modified(I)  Short term goal 3: Pt will ambulate x 125 feet using std. walker with supervision  Short term goal 4: By 5/29/19: Pt will tolerate 12-15 reps LLE ther ex for ROM/strengthening  Short term goal 5: Pt will ambulate up/down 2 steps without handrails using appropriate AD with CGA/SBA x 1  Patient Goals   Patient goals :  \"To go home\"       Therapy Time   Individual Concurrent Group Co-treatment   Time In 1630         Time Out 1704         Minutes 34         Timed Code Treatment Minutes: [address] [place], [place] #[id number]
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
## 2019-05-28 NOTE — OP NOTE
PATIENT NAME Danilo Ellington M.D. SURGERY DATE  5/28/2019 12:27 PM    AGE 59 y.o. PATIENT TYPE male    PRE-OPERATIVE DIAGNOSIS: left knee osteoarthritis    POST-OPERATIVE DIAGNOSIS: left knee osteoarthritis    PROCEDURE PERFORMED: left total knee replacement using robotic assistance ([employer] Robot); all three components were press-fit ; posterior cruciate retaining implants. Median parapatellar arthrotomy; without lateral retinacular release. Implants used:  ENRIQUE TRIATHALON TKR SYSTEM WITH:    Size 4 CR Femoral component                          Size 4 primary tibial baseplate                          Size 9 mm X3 polyethylene CS tibial insert                          Size 31 x9 mm symmetric patellar component    FIRST ASSISTANT: Amber Alvarado PA-C    SECOND ASSISTANT: Karlos Greene MD     ANESTHESIA:spinal with adductor canal nerve block. COMPLICATIONS: None apparent. POST-OP CONDITION:  To recovery room. SPECIMENS: BONE    EBL: MINIMAL    INDICATIONS FOR SURGERY: The patient is a 59y.o.-year-old male with a long history of knee pain affecting the activities of daily living. Pt had severe pain inability to ambulate, night pain and frequent falls. The pain was refractory to conservative management including injections (corticosteroid/viscosupplementation); PT, Ambulatory aids; oral medication (NSAIDs and pain medication) and bracing for 3 - 6 months. Preop workup showed radiographic changes with osteophyte formation; loss of cartilage space; subchondral sclerosis and deformity. The patient is scheduled for a left total knee replacement. The risks, benefits and alternatives of surgery were clearly discussed and the patient wished to proceed with surgery. OPERATIVE FINDINGS: severe osteoarthritis with tricompartmental involvement and varus deformity. Complete loss of articular cartilage, osteophyte formation and severe synovitis.    DETAILS OF PROCEDURE: The patient was performed noting excellent ligament balance both medial and laterally. The patella  tracked centrally with out lateral retinacular release. Next, the posterior osteophytes and meniscal remnants were resected. The pain cocktail was injected into the posterior capsule of the medial and lateral gutters. Final range of motion: 0 extension to 135 degrees of flexion. Next all trials were removed. The femoral and tibia array pins were removed. The cut bony surfaces were irrigated with copious amounts of irrigation solution. The tibial, femoral and patellar components were press-fit in place. The insert was then placed. The knee was held in extension. The knee was irrigated with 5 liters of irrigation solution using pulsatile lavage. The tourniquet was let down before closure. Any bleeding vessels were coagulated using the Bovie cautery. The knee was then closed in layers using #2 Ethibond to close the extensor mechanism, a combination of 0- and 2-0 Monocryl to close the subcutaneous tissue and 4-0 Monocryl to close the skin. Dermaflex was applied to seal the wound and allowed to dry before a dry sterile compression dressing was applied. The patient was then taken to recovery in stable condition having tolerated the procedure well.     Meredith Castro M.D.

## 2019-05-28 NOTE — ANESTHESIA PROCEDURE NOTES
Spinal Block    Patient location during procedure: OR  Start time: 5/28/2019 11:02 AM  End time: 5/28/2019 11:07 AM  Reason for block: primary anesthetic and at surgeon's request  Staffing  Resident/CRNA: ANTONIO Christy CRNA  Performed: resident/CRNA   Preanesthetic Checklist  Completed: patient identified, site marked, surgical consent, pre-op evaluation, timeout performed, IV checked, risks and benefits discussed, monitors and equipment checked, anesthesia consent given, oxygen available and patient being monitored  Spinal Block  Patient position: sitting  Prep: Betadine  Patient monitoring: continuous pulse ox, cardiac monitor and frequent blood pressure checks  Approach: midline  Location: L3/L4  Provider prep: sterile gloves and mask  Agent: bupivacaine  Dose: 1.6  Dose: 1.6  Needle  Needle type: Quincke   Needle gauge: 25 G  Needle length: 3.5 in  Assessment  Sensory level: T6  Swirl obtained: Yes  CSF: clear  Attempts: 1  Hemodynamics: stable
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
## 2019-05-28 NOTE — PROGRESS NOTES
Consult has been PerfectServed to [facility] on 5/28/2019 at 16:01.   Georges Garza  5/28/2019

## 2019-05-28 NOTE — PROGRESS NOTES
Pt arrived to room 544, A/O, VSS, family at bedside. Call light within reach. Instructed pt to call w/ any needs/concerns. Will continue to monitor.

## 2019-05-28 NOTE — PROGRESS NOTES
4 Eyes Skin Assessment     The patient is being assess for   Admission    I agree that 2 RN's have performed a thorough Head to Toe Skin Assessment on the patient. ALL assessment sites listed below have been assessed. Areas assessed by both nurses:   [x]   Head, Face, and Ears   [x]   Shoulders, Back, and Chest, Abdomen  [x]   Arms, Elbows, and Hands   [x]   Coccyx, Sacrum, and Ischium  [x]   Legs, Feet, and Heels  Right lower leg with large scabbed area over shin. Small scab on left shin. Patient states scratches area open. Skin with darker discoloration over lower legs     **SHARE this note so that the co-signing nurse is able to place an eSignature**    Co-signer eSignature: Electronically signed by Donna Joyner RN on 5/28/19 at 11:31 AM    Does the Patient have Skin Breakdown?   No          Eric Prevention initiated:  No   Wound Care Orders initiated:  No      WOC nurse consulted for Pressure Injury (Stage 3,4, Unstageable, DTI, NWPT, Complex wounds)and New or Established Ostomies:  No      Primary Nurse eSignature: Electronically signed by Alek Marrufo RN on 5/28/19 at 11:09 AM

## 2019-05-28 NOTE — PROGRESS NOTES
4 Eyes Skin Assessment     The patient is being assess for   Post-Op Surgical    I agree that 2 RN's have performed a thorough Head to Toe Skin Assessment on the patient. ALL assessment sites listed below have been assessed. Areas assessed by both nurses:   []   Head, Face, and Ears   [x]   Shoulders, Back, and Chest, Abdomen  [x]   Arms, Elbows, and Hands   [x]   Coccyx, Sacrum, and Ischium  []   Legs, Feet, and Heels        ****    **SHARE this note so that the co-signing nurse is able to place an eSignature**    Co-signer eSignature: Electronically signed by Mari Monk RN on 5/28/19 at 3:22 PM    Does the Patient have Skin Breakdown?   {Blank single:43726::\"No\",\"Yes LDA WOUND CARE was Initiated documentation include the Roxann-wound, Wound Assessment, Measurements, Dressing Treatment, Drainage, and Color\",\"}          Eric Prevention initiated:  {YES/NO:19732}   Wound Care Orders initiated:  {YES/NO:19732}      [facility] nurse consulted for Pressure Injury (Stage 3,4, Unstageable, DTI, NWPT, Complex wounds)and New or Established Ostomies:  {YES/NO:19732}      Primary Nurse eSignature: {Esignature:[id number]}

## 2019-05-29 VITALS
HEART RATE: 69 BPM | OXYGEN SATURATION: 92 % | HEIGHT: 60 IN | BODY MASS INDEX: 34.75 KG/M2 | WEIGHT: 177 LBS | TEMPERATURE: 97.9 F | DIASTOLIC BLOOD PRESSURE: 63 MMHG | RESPIRATION RATE: 16 BRPM | SYSTOLIC BLOOD PRESSURE: 118 MMHG

## 2019-05-29 PROBLEM — Z96.652 STATUS POST TOTAL LEFT KNEE REPLACEMENT: Status: ACTIVE | Noted: 2019-05-29

## 2019-05-29 LAB
ANION GAP SERPL CALCULATED.3IONS-SCNC: 7 MMOL/L (ref 3–16)
BASOPHILS ABSOLUTE: 0 K/UL (ref 0–0.2)
BASOPHILS RELATIVE PERCENT: 0.1 %
BUN BLDV-MCNC: 21 MG/DL (ref 7–20)
CALCIUM SERPL-MCNC: 8.7 MG/DL (ref 8.3–10.6)
CHLORIDE BLD-SCNC: 104 MMOL/L (ref 99–110)
CO2: 29 MMOL/L (ref 21–32)
CREAT SERPL-MCNC: 1.1 MG/DL (ref 0.8–1.3)
EOSINOPHILS ABSOLUTE: 0 K/UL (ref 0–0.6)
EOSINOPHILS RELATIVE PERCENT: 0 %
GFR AFRICAN AMERICAN: >60
GFR NON-AFRICAN AMERICAN: >60
GLUCOSE BLD-MCNC: 137 MG/DL (ref 70–99)
GLUCOSE BLD-MCNC: 145 MG/DL (ref 70–99)
GLUCOSE BLD-MCNC: 158 MG/DL (ref 70–99)
GLUCOSE BLD-MCNC: 246 MG/DL (ref 70–99)
HCT VFR BLD CALC: 30.8 % (ref 40.5–52.5)
HEMOGLOBIN: 10.6 G/DL (ref 13.5–17.5)
LYMPHOCYTES ABSOLUTE: 0.7 K/UL (ref 1–5.1)
LYMPHOCYTES RELATIVE PERCENT: 7 %
MCH RBC QN AUTO: 33.5 PG (ref 26–34)
MCHC RBC AUTO-ENTMCNC: 34.3 G/DL (ref 31–36)
MCV RBC AUTO: 97.6 FL (ref 80–100)
MONOCYTES ABSOLUTE: 1 K/UL (ref 0–1.3)
MONOCYTES RELATIVE PERCENT: 10.6 %
NEUTROPHILS ABSOLUTE: 7.9 K/UL (ref 1.7–7.7)
NEUTROPHILS RELATIVE PERCENT: 82.3 %
PDW BLD-RTO: 13.4 % (ref 12.4–15.4)
PERFORMED ON: ABNORMAL
PLATELET # BLD: 129 K/UL (ref 135–450)
PMV BLD AUTO: 8.4 FL (ref 5–10.5)
POTASSIUM REFLEX MAGNESIUM: 4.7 MMOL/L (ref 3.5–5.1)
RBC # BLD: 3.15 M/UL (ref 4.2–5.9)
SODIUM BLD-SCNC: 140 MMOL/L (ref 136–145)
WBC # BLD: 9.6 K/UL (ref 4–11)

## 2019-05-29 PROCEDURE — 36415 COLL VENOUS BLD VENIPUNCTURE: CPT

## 2019-05-29 PROCEDURE — 97110 THERAPEUTIC EXERCISES: CPT

## 2019-05-29 PROCEDURE — 97535 SELF CARE MNGMENT TRAINING: CPT

## 2019-05-29 PROCEDURE — 85025 COMPLETE CBC W/AUTO DIFF WBC: CPT

## 2019-05-29 PROCEDURE — 80048 BASIC METABOLIC PNL TOTAL CA: CPT

## 2019-05-29 PROCEDURE — APPSS15 APP SPLIT SHARED TIME 0-15 MINUTES: Performed by: PHYSICIAN ASSISTANT

## 2019-05-29 PROCEDURE — 97530 THERAPEUTIC ACTIVITIES: CPT

## 2019-05-29 PROCEDURE — 6370000000 HC RX 637 (ALT 250 FOR IP): Performed by: PHYSICIAN ASSISTANT

## 2019-05-29 PROCEDURE — 2580000003 HC RX 258: Performed by: PHYSICIAN ASSISTANT

## 2019-05-29 PROCEDURE — 6360000002 HC RX W HCPCS: Performed by: PHYSICIAN ASSISTANT

## 2019-05-29 PROCEDURE — 97116 GAIT TRAINING THERAPY: CPT

## 2019-05-29 RX ORDER — OXYCODONE HYDROCHLORIDE AND ACETAMINOPHEN 5; 325 MG/1; MG/1
1-2 TABLET ORAL EVERY 4 HOURS PRN
Qty: 40 TABLET | Refills: 0 | Status: SHIPPED | OUTPATIENT
Start: 2019-05-29 | End: 2019-06-03 | Stop reason: SDUPTHER

## 2019-05-29 RX ADMIN — PIOGLITAZONE 45 MG: 30 TABLET ORAL at 10:09

## 2019-05-29 RX ADMIN — METFORMIN HYDROCHLORIDE 500 MG: 500 TABLET ORAL at 10:09

## 2019-05-29 RX ADMIN — MORPHINE SULFATE 4 MG: 4 INJECTION INTRAVENOUS at 01:30

## 2019-05-29 RX ADMIN — INSULIN LISPRO 2 UNITS: 100 INJECTION, SOLUTION INTRAVENOUS; SUBCUTANEOUS at 12:38

## 2019-05-29 RX ADMIN — SODIUM CHLORIDE, POTASSIUM CHLORIDE, SODIUM LACTATE AND CALCIUM CHLORIDE: 600; 310; 30; 20 INJECTION, SOLUTION INTRAVENOUS at 03:00

## 2019-05-29 RX ADMIN — FAMOTIDINE 20 MG: 20 TABLET, FILM COATED ORAL at 10:09

## 2019-05-29 RX ADMIN — SODIUM CHLORIDE, PRESERVATIVE FREE 10 ML: 5 INJECTION INTRAVENOUS at 10:10

## 2019-05-29 RX ADMIN — OXYCODONE HYDROCHLORIDE AND ACETAMINOPHEN 2 TABLET: 5; 325 TABLET ORAL at 16:32

## 2019-05-29 RX ADMIN — ISOSORBIDE MONONITRATE 30 MG: 30 TABLET, EXTENDED RELEASE ORAL at 10:09

## 2019-05-29 RX ADMIN — GLIPIZIDE 10 MG: 5 TABLET ORAL at 10:09

## 2019-05-29 RX ADMIN — Medication 2 G: at 03:01

## 2019-05-29 RX ADMIN — APIXABAN 2.5 MG: 2.5 TABLET, FILM COATED ORAL at 10:09

## 2019-05-29 RX ADMIN — DOCUSATE SODIUM 100 MG: 100 CAPSULE, LIQUID FILLED ORAL at 10:09

## 2019-05-29 RX ADMIN — LOSARTAN POTASSIUM 100 MG: 100 TABLET, FILM COATED ORAL at 10:09

## 2019-05-29 RX ADMIN — OXYCODONE HYDROCHLORIDE AND ACETAMINOPHEN 2 TABLET: 5; 325 TABLET ORAL at 11:52

## 2019-05-29 ASSESSMENT — PAIN SCALES - GENERAL
PAINLEVEL_OUTOF10: 10
PAINLEVEL_OUTOF10: 3
PAINLEVEL_OUTOF10: 10
PAINLEVEL_OUTOF10: 9
PAINLEVEL_OUTOF10: 3

## 2019-05-29 ASSESSMENT — PAIN SCALES - WONG BAKER: WONGBAKER_NUMERICALRESPONSE: 6

## 2019-05-29 ASSESSMENT — PAIN DESCRIPTION - ORIENTATION: ORIENTATION: LEFT

## 2019-05-29 ASSESSMENT — PAIN DESCRIPTION - LOCATION: LOCATION: KNEE

## 2019-05-29 ASSESSMENT — PAIN DESCRIPTION - PAIN TYPE: TYPE: SURGICAL PAIN
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
## 2019-05-29 NOTE — CARE COORDINATION
[facility]    Spoke with pt and pt's niece at bedside about [facility], all questions answered. Pt is agreeable to PT/OT. Pt aware agency will call to schedule [facility], agency is to call pt's sister, Silviano Sotomayor to set up services. Pt is agreeable to having a SOC within 2 days of DC. Demographics verified, pt will be staying with Silviano Sotomayor at [place] [address]. Orders will be faxed to [facility]. Should pt dc over the weekend please fax facesheet, order, BRITTNY, and H&P to [facility]. upate 3:55- DC order noted, orders faxed to [facility].    Anuradha Crockett RN CTN with [facility]  FGM:[phone]

## 2019-05-29 NOTE — CARE COORDINATION
Met with patient at bedside, discussed role of nurse navigator and gave contact information. Reviewed reasons to call with questions or concerns, importance of TEDS, Incentive spirometer, pain medication, and physical therapy. Pt set up with orthovitals and understands when and how to use. Will follow up with call to patient in a few days.     [facility]  Orthopedic Nurse Navigator  Phone number: [phone]

## 2019-05-29 NOTE — PROGRESS NOTES
Department of Orthopedic Surgery  Physician Assistant   Progress Note    Subjective:     Post-Operative Day: 1 Status Post left Total Knee Arthroplasty  Systemic or Specific Complaints:No Complaints per patient today. Able to ambulate to walk with therapy this am.      Objective:     Patient Vitals for the past 24 hrs:   BP Temp Temp src Pulse Resp SpO2   05/29/19 1009 115/73 97.5 °F (36.4 °C) Oral 77 16 94 %   05/29/19 0432 (!) 101/57 97.4 °F (36.3 °C) Oral 63 18 91 %   05/28/19 2224 108/64 97.7 °F (36.5 °C) Oral 71 18 93 %   05/28/19 1930 118/66 97.9 °F (36.6 °C) Oral 65 20 91 %   05/28/19 1755 (!) 145/67 97.6 °F (36.4 °C) Oral 66 16 92 %   05/28/19 1650 135/70 98 °F (36.7 °C) Oral 72 16 92 %   05/28/19 1557 104/67 97.5 °F (36.4 °C) Oral 68 16 91 %   05/28/19 1520 -- -- -- 55 17 95 %   05/28/19 1515 -- -- -- 52 17 97 %   05/28/19 1455 (!) 95/56 -- -- 56 19 96 %   05/28/19 1440 (!) 97/57 -- -- 53 17 99 %   05/28/19 1425 (!) 121/48 -- -- (!) 49 15 100 %   05/28/19 1410 (!) 107/50 -- -- 51 17 99 %   05/28/19 1355 105/62 -- -- 50 18 98 %   05/28/19 1335 (!) 91/49 -- -- 54 22 96 %   05/28/19 1330 (!) 98/53 -- -- 50 19 97 %   05/28/19 1325 (!) 109/47 -- -- 58 13 99 %   05/28/19 1320 (!) 100/51 -- -- 59 16 99 %   05/28/19 1315 (!) 93/47 -- -- 53 18 96 %   05/28/19 1310 (!) 89/49 -- -- 66 17 91 %   05/28/19 1305 (!) 82/55 -- -- 72 20 91 %   05/28/19 1301 -- 97.4 °F (36.3 °C) Temporal -- -- --       General: alert, appears stated age, cooperative and no distress   Wound: Wound clean and dry no evidence of infection. Motion: Painful range of Motion   DVT Exam: No evidence of DVT seen on physical exam.       Knee swollen but thigh soft to palpation. Moving foot and ankle. Good distal pulses.       Data Review  CBC:   Lab Results   Component Value Date    WBC 9.6 05/29/2019    RBC 3.15 05/29/2019    HGB 10.6 05/29/2019    HCT 30.8 05/29/2019     05/29/2019       Assessment:     Status Post left Total Knee
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
## 2019-05-29 NOTE — CARE COORDINATION
DCP met with Juana MAK Re: Reason for Consult? Answer: s/p TKR. Pt needs  and [facility] set up at this time. He has no agency preference. Referral placed to [facility] for DME. Barbie Salas aware and will deliver. No further needs identified by DCP. Please contact should further needs arise.      José Miguel Rhodes RN

## 2019-05-29 NOTE — PLAN OF CARE
Problem: Pain:  Description  Pain management should include both nonpharmacologic and pharmacologic interventions. Goal: Pain level will decrease  Description  Pain level will decrease  Outcome: Ongoing   Pt using pain scale 0-10 to rate pain . Instructed to call with increasing pain, pain unrelieved or when needs pain med. calllght within reach.  Will continue to monitor

## 2019-05-29 NOTE — CARE COORDINATION
[facility]    Referral received from  RN CM. I will continue to follow patient for needs, and arrange [place] [id number] services prior to DC.

## 2019-05-31 ENCOUNTER — TELEPHONE (OUTPATIENT)
Dept: ORTHOPEDIC SURGERY | Age: 59
End: 2019-05-31

## 2019-05-31 DIAGNOSIS — Z96.652 STATUS POST TOTAL LEFT KNEE REPLACEMENT: ICD-10-CM

## 2019-05-31 NOTE — TELEPHONE ENCOUNTER
Spoke with Jennifer, pts sister. Incision status: No drainage or redness    Edema/Swelling/Teds: Swelling is there, but not terrible. Icing and elevating. Pain level and status: Pain is managed. Use of pain medications: Using 1-2 pain pills. Blood thinner: Eliquis no issues. Bowels: No BM, hasn't used any stool softener but will pick some up today. Home Care Agency active: Active with PT    Outpatient therapy: NA    Do you have all of your medications: yes    Changes in medications: no    Ortho Vitals: Hasn't used yet, will try.      Follow up appointments:    Future Appointments   Date Time Provider Esthela Woods   6/12/2019  1:20 PM OBDULIO Jordan AND YOLANDE   7/23/2019  1:00 PM ALEXANDER [facility]ASC[facility], VASCULAR ROOM [facility]  None

## 2019-06-03 DIAGNOSIS — Z96.652 STATUS POST TOTAL LEFT KNEE REPLACEMENT: Primary | ICD-10-CM

## 2019-06-03 RX ORDER — OXYCODONE HYDROCHLORIDE AND ACETAMINOPHEN 5; 325 MG/1; MG/1
1 TABLET ORAL EVERY 6 HOURS PRN
Qty: 28 TABLET | Refills: 0 | Status: SHIPPED | OUTPATIENT
Start: 2019-06-03 | End: 2019-06-12 | Stop reason: SDUPTHER

## 2019-06-04 ENCOUNTER — TELEPHONE (OUTPATIENT)
Dept: ORTHOPEDIC SURGERY | Age: 59
End: 2019-06-04

## 2019-06-04 DIAGNOSIS — Z96.652 STATUS POST TOTAL LEFT KNEE REPLACEMENT: ICD-10-CM

## 2019-06-07 PROBLEM — Z01.810 PRE-OPERATIVE CARDIOVASCULAR EXAMINATION: Status: RESOLVED | Noted: 2019-05-08 | Resolved: 2019-06-07

## 2019-06-12 ENCOUNTER — OFFICE VISIT (OUTPATIENT)
Dept: ORTHOPEDIC SURGERY | Age: 59
End: 2019-06-12

## 2019-06-12 DIAGNOSIS — Z96.652 STATUS POST TOTAL LEFT KNEE REPLACEMENT: Primary | ICD-10-CM

## 2019-06-12 PROCEDURE — 99024 POSTOP FOLLOW-UP VISIT: CPT | Performed by: PHYSICIAN ASSISTANT

## 2019-06-12 RX ORDER — OXYCODONE HYDROCHLORIDE AND ACETAMINOPHEN 5; 325 MG/1; MG/1
1 TABLET ORAL EVERY 6 HOURS PRN
Qty: 28 TABLET | Refills: 0 | Status: SHIPPED | OUTPATIENT
Start: 2019-06-11 | End: 2019-06-24 | Stop reason: SDUPTHER

## 2019-06-12 NOTE — PROGRESS NOTES
History of present illness: The patient returns today after left total knee replacement. Pain control has been satisfactory with oral medications. There have been no fevers or chills. Physical examination: The incision is clean, dry, and intact with no drainage or signs of infection. Range of motion is 0 degrees of extension to 90 degrees of flexion. There is expected swelling with no evidence of DVT and a negative Homans sign. Neurovascular exam is intact. Radiographs: X-rays taken today including AP, lateral, and skyline views of the left knee show excellent alignment of the prosthesis. No evidence of septic or aseptic loosening or periprosthetic fractures. Assessment/plan: We would like to begin outpatient physical therapy to restore range of motion and strength. The patient was given local wound care instructions. We did refill their pain medication today. We will followup in 3-4 weeks for reevaluation.

## 2019-06-18 ENCOUNTER — HOSPITAL ENCOUNTER (OUTPATIENT)
Dept: PHYSICAL THERAPY | Age: 59
Setting detail: THERAPIES SERIES
Discharge: HOME | End: 2019-06-18
Payer: MEDICARE

## 2019-06-18 PROCEDURE — 97161 PT EVAL LOW COMPLEX 20 MIN: CPT | Performed by: SPECIALIST

## 2019-06-18 PROCEDURE — 97110 THERAPEUTIC EXERCISES: CPT | Performed by: SPECIALIST

## 2019-06-18 PROCEDURE — G0283 ELEC STIM OTHER THAN WOUND: HCPCS | Performed by: SPECIALIST

## 2019-06-18 NOTE — FLOWSHEET NOTE
Activities:    [x] (05373 or 66736) Provided verbal/tactile cueing for activities related to improving balance, coordination, kinesthetic sense, posture, motor skill, proprioception and motor activation to allow for proper function of core, proximal hip and LE with self care and ADLs  [] (95336) Gait Re-education- Provided training and instruction to the patient for proper LE, core and proximal hip recruitment and positioning and eccentric body weight control with ambulation re-education including up and down stairs     Home Exercise Program:    [x] (71807) Reviewed/Progressed HEP activities related to strengthening, flexibility, endurance, ROM of core, proximal hip and LE for functional self-care, mobility, lifting and ambulation/stair navigation   [] (37799)Reviewed/Progressed HEP activities related to improving balance, coordination, kinesthetic sense, posture, motor skill, proprioception of core, proximal hip and LE for self care, mobility, lifting, and ambulation/stair navigation      Manual Treatments:  PROM / STM / Oscillations-Mobs:  G-I, II, III, IV (PA's, Inf., Post.)  [x] (36354) Provided manual therapy to mobilize LE, proximal hip and/or LS spine soft tissue/joints for the purpose of modulating pain, promoting relaxation,  increasing ROM, reducing/eliminating soft tissue swelling/inflammation/restriction, improving soft tissue extensibility and allowing for proper ROM for normal function with self care, mobility, lifting and ambulation. Modalities:  ESU/CP    Charges:  Timed Code Treatment Minutes: 25   Total Treatment Minutes: 55     [x] EVAL  [x] BU(40612) x  2   [] IONTO  [] NMR (45895) x      [] VASO  [] Manual (19965) x       [] Other:  [] TA x       [] Mech Traction (64378)  [] ES(attended) (58556)      [x] ES (un) (61615):     GOALS:    Patient stated goal: walking, yardwork    Therapist goals for Patient:   Short Term Goals: To be achieved in: 2 weeks  1.  Independent in HEP and progression per patient tolerance, in order to prevent re-injury. 2. Patient will have a decrease in pain to facilitate improvement in movement, function, and ADLs as indicated by Functional Deficits. Long Term Goals: To be achieved in: 6 weeks  1. Disability index score of 44% or less for the LEFS to assist with reaching prior level of function. 2. Patient will demonstrate increased AROM to 120-0  to allow for proper joint functioning as indicated by patients Functional Deficits. 3. Patient will demonstrate an increase in Strength to good proximal hip strength and control, within 5lb HHD in LE to allow for proper functional mobility as indicated by patients Functional Deficits. 4. Patient will return to [facility] for  functional activities without increased symptoms or restriction. 5. Walk community distances with min to no limitations without AD (patient specific functional goal)       Progression Towards Functional goals:  [] Patient is progressing as expected towards functional goals listed. [] Progression is slowed due to complexities listed. [] Progression has been slowed due to co-morbidities.   [x] Plan just implemented, too soon to assess goals progression  [] Other:     ASSESSMENT:  See eval    Treatment/Activity Tolerance:  [x] Patient tolerated treatment well [] Patient limited by fatique  [] Patient limited by pain  [] Patient limited by other medical complications  [] Other:     Prognosis: [x] Good [] Fair  [] Poor    Patient Requires Follow-up: [x] Yes  [] No    PLAN: See eval  [] Continue per plan of care [] Alter current plan (see comments)  [x] Plan of care initiated [] Hold pending MD visit [] Discharge    Electronically signed by: Rupal Edwards PT

## 2019-06-18 NOTE — PLAN OF CARE
[facility], [address], [address] [facility] [address]  Phone: [phone]   Fax:     [phone]       Physical Therapy Certification    Dear  Dr Della Harper,    We had the pleasure of evaluating the following patient for physical therapy services at [address] [place] [address]. A summary of our findings can be found in the initial assessment below. This includes our plan of care. If you have any questions or concerns regarding these findings, please do not hesitate to contact me at the office phone number checked above.   Thank you for the referral.       Physician Signature:_______________________________Date:__________________  By signing above (or electronic signature), therapists plan is approved by physician      Patient: Eulalia Greene   : 1960   MRN: [id number]  Referring Physician:  Dr Della Harper      Evaluation Date: 2019      Medical Diagnosis Information:    A34.347 (ICD-10-CM) - Status post total left knee replacement                    19                        Insurance information:  [facility]     Precautions/ Contra-indications: DM, HTN, heart  Latex Allergy:  [x]NO      []YES  Preferred Language for Healthcare:   [x]English       []other:    SUBJECTIVE: Patient states history of OA with resultant L TKR on 19    Relevant Medical History:HTN, DM, heart  Functional Disability Index:  LEFS  88%    Pain Scale: 8/10  Easing factors: ice  Provocative factors: walk     Type: []Constant   [x]Intermittent  []Radiating []Localized []other:     Numbness/Tingling: none    Occupation/School:disabled    Living Status/Prior Level of Function: Independent with ADLs and IADLs,     OBJECTIVE:     ROM LEFT RIGHT   HIP Flex     HIP Abd     HIP Ext     HIP IR     HIP ER     Knee ext -6    Knee Flex AROM 96/ PROM 102    Ankle PF     Ankle DF     Ankle In     Ankle Ev participation in [id number]. Classification :    [x]Signs/symptoms consistent with post-surgical status including decreased ROM, strength and function. []Signs/symptoms consistent with joint sprain/strain   []Signs/symptoms consistent with patella-femoral syndrome   []Signs/symptoms consistent with knee OA/hip OA   []Signs/symptoms consistent with internal derangement of knee/Hip   []Signs/symptoms consistent with functional hip weakness/NMR control      []Signs/symptoms consistent with tendinitis/tendinosis    []signs/symptoms consistent with pathology which may benefit from Dry needling      []other:      Prognosis/Rehab Potential:      []Excellent   [x]Good    []Fair   []Poor    Tolerance of evaluation/treatment:    []Excellent   [x]Good    []Fair   []Poor    Physical Therapy Evaluation Complexity Justification  [x] A history of present problem with:  [] no personal factors and/or comorbidities that impact the plan of care;  [x]1-2 personal factors and/or comorbidities that impact the plan of care  []3 personal factors and/or comorbidities that impact the plan of care  [x] An examination of body systems using standardized tests and measures addressing any of the following: body structures and functions (impairments), activity limitations, and/or participation restrictions;:  [x] a total of 1-2 or more elements   [] a total of 3 or more elements   [] a total of 4 or more elements   [x] A clinical presentation with:  [x] stable and/or uncomplicated characteristics   [] evolving clinical presentation with changing characteristics  [] unstable and unpredictable characteristics;   [x] Clinical decision making of [x] low, [] moderate, [] high complexity using standardized patient assessment instrument and/or measurable assessment of functional outcome.     [x] EVAL (LOW) 89178 (typically 20 minutes face-to-face)  [] EVAL (MOD) 96661 (typically 30 minutes face-to-face)  [] EVAL (HIGH) 23241 (typically 45 minutes face-to-face)  [] RE-EVAL     PLAN:   Frequency/Duration:  1-2 days per week for 6 Weeks:  Interventions:  [x]  Therapeutic exercise including: strength training, ROM, for Lower extremity and core   [x]  NMR activation and proprioception for LE, Glutes and Core   [x]  Manual therapy as indicated for LE, Hip and spine to include: Dry Needling/IASTM, STM, PROM, Gr I-IV mobilizations, manipulation. [x] Modalities as needed that may include: thermal agents, E-stim, Biofeedback, US, iontophoresis as indicated  [x] Patient education on joint protection, postural re-education, activity modification, progression of HEP. HEP instruction: Instructed and given handouts(see scanned forms)    GOALS:  Patient stated goal: walking, yardwork    Therapist goals for Patient:   Short Term Goals: To be achieved in: 2 weeks  1. Independent in HEP and progression per patient tolerance, in order to prevent re-injury. 2. Patient will have a decrease in pain to facilitate improvement in movement, function, and ADLs as indicated by Functional Deficits. Long Term Goals: To be achieved in: 6 weeks  1. Disability index score of 44% or less for the LEFS to assist with reaching prior level of function. 2. Patient will demonstrate increased AROM to 120-0  to allow for proper joint functioning as indicated by patients Functional Deficits. 3. Patient will demonstrate an increase in Strength to good proximal hip strength and control, within 5lb HHD in LE to allow for proper functional mobility as indicated by patients Functional Deficits. 4. Patient will return to [facility] for  functional activities without increased symptoms or restriction.    5. Walk community distances with min to no limitations without AD (patient specific functional goal)       Electronically signed by:  Dorothy Tamez, AMMY

## 2019-06-24 DIAGNOSIS — Z96.652 STATUS POST TOTAL LEFT KNEE REPLACEMENT: ICD-10-CM

## 2019-06-24 RX ORDER — OXYCODONE HYDROCHLORIDE AND ACETAMINOPHEN 5; 325 MG/1; MG/1
1 TABLET ORAL EVERY 6 HOURS PRN
Qty: 28 TABLET | Refills: 0 | Status: SHIPPED | OUTPATIENT
Start: 2019-06-24 | End: 2019-07-11 | Stop reason: SDUPTHER

## 2019-06-26 ENCOUNTER — HOSPITAL ENCOUNTER (OUTPATIENT)
Dept: PHYSICAL THERAPY | Age: 59
Setting detail: THERAPIES SERIES
Discharge: HOME | End: 2019-06-26
Payer: MEDICARE

## 2019-06-26 PROCEDURE — 97110 THERAPEUTIC EXERCISES: CPT | Performed by: SPECIALIST

## 2019-06-26 PROCEDURE — 97112 NEUROMUSCULAR REEDUCATION: CPT | Performed by: SPECIALIST

## 2019-06-26 PROCEDURE — G0283 ELEC STIM OTHER THAN WOUND: HCPCS | Performed by: SPECIALIST

## 2019-06-26 PROCEDURE — 97140 MANUAL THERAPY 1/> REGIONS: CPT | Performed by: SPECIALIST

## 2019-06-26 NOTE — FLOWSHEET NOTE
[address] [place] [address] [facility]    Physical Therapy Daily Treatment Note  Date:  2019    Patient Name:  Teja Farrell    :  1960  MRN: [id number]  Restrictions/Precautions:  none  Medical/Treatment Diagnosis Information:  ·   F55.257 (ICD-10-CM) - Status post total left knee replacement     Insurance/Certification information:   [facility]  Physician Information:   Dr Saurabh Zayas of care signed (Y/N):     Date of Patient follow up with Physician:     G-Code (if applicable):      Date G-Code Applied:  19    LEFS  88%    Progress Note: [x]  Yes  []  No  Next due by: Visit #10       Latex Allergy:  [x]NO      []YES  Preferred Language for Healthcare:   [x]English       []other:    Visit # Insurance Allowable   2 MC, 80/20     Pain level:  8/10     SUBJECTIVE:  Compliance with HEP    OBJECTIVE: See eval  Observation:   Test measurements:    19: AROM 100(-3)  PROM 110-0    RESTRICTIONS/PRECAUTIONS: Patient does not read/write    Exercises/Interventions:     Therapeutic Ex Sets/sec Reps Notes HEP   QS 3 10x  x   SLR/abd/add  10x     Heel slides  5x  x                 Bike ROM 5 min     Prone knee hang  2 min            HR/TR  10x  x   SLS  30 sec  x   Slant board  1 min                                        Manual Intervention       Gentle ROM  8 min                                        NMR re-education                                                               ESU/CP  10 min         Therapeutic Exercise and NMR EXR  [x] (64455) Provided verbal/tactile cueing for activities related to strengthening, flexibility, endurance, ROM for improvements in LE, proximal hip, and core control with self care, mobility, lifting, ambulation.  [] (55831) Provided verbal/tactile cueing for activities related to improving balance, coordination, kinesthetic sense, posture, motor skill, proprioception  to assist with LE, proximal hip, and core control in self care, mobility, lifting, ambulation and eccentric single leg control. NMR and Therapeutic Activities:    [x] (99706 or 33602) Provided verbal/tactile cueing for activities related to improving balance, coordination, kinesthetic sense, posture, motor skill, proprioception and motor activation to allow for proper function of core, proximal hip and LE with self care and ADLs  [] (30990) Gait Re-education- Provided training and instruction to the patient for proper LE, core and proximal hip recruitment and positioning and eccentric body weight control with ambulation re-education including up and down stairs     Home Exercise Program:    [x] (84630) Reviewed/Progressed HEP activities related to strengthening, flexibility, endurance, ROM of core, proximal hip and LE for functional self-care, mobility, lifting and ambulation/stair navigation   [] (55950)Reviewed/Progressed HEP activities related to improving balance, coordination, kinesthetic sense, posture, motor skill, proprioception of core, proximal hip and LE for self care, mobility, lifting, and ambulation/stair navigation      Manual Treatments:  PROM / STM / Oscillations-Mobs:  G-I, II, III, IV (PA's, Inf., Post.)  [x] (79039) Provided manual therapy to mobilize LE, proximal hip and/or LS spine soft tissue/joints for the purpose of modulating pain, promoting relaxation,  increasing ROM, reducing/eliminating soft tissue swelling/inflammation/restriction, improving soft tissue extensibility and allowing for proper ROM for normal function with self care, mobility, lifting and ambulation.      Modalities:  ESU/CP    Charges:  Timed Code Treatment Minutes: 38   Total Treatment Minutes: 48     [] EVAL  [x] YF(00930) x  1   [] IONTO  [x] NMR (34377) x  1   [] VASO  [x] Manual (24823) x  1    [] Other:  [] TA x       [] Mech Traction (02866)  [] ES(attended) (88877)      [x] ES (un) (62356):     GOALS:    Patient stated goal: walking, yardwork    Therapist goals for Patient:   Short Term Goals: To be achieved in: 2 weeks  1. Independent in HEP and progression per patient tolerance, in order to prevent re-injury. 2. Patient will have a decrease in pain to facilitate improvement in movement, function, and ADLs as indicated by Functional Deficits. Long Term Goals: To be achieved in: 6 weeks  1. Disability index score of 44% or less for the LEFS to assist with reaching prior level of function. 2. Patient will demonstrate increased AROM to 120-0  to allow for proper joint functioning as indicated by patients Functional Deficits. 3. Patient will demonstrate an increase in Strength to good proximal hip strength and control, within 5lb HHD in LE to allow for proper functional mobility as indicated by patients Functional Deficits. 4. Patient will return to [facility] for  functional activities without increased symptoms or restriction. 5. Walk community distances with min to no limitations without AD (patient specific functional goal)       Progression Towards Functional goals:  [x] Patient is progressing as expected towards functional goals listed. [] Progression is slowed due to complexities listed. [] Progression has been slowed due to co-morbidities.   [] Plan just implemented, too soon to assess goals progression  [] Other:     ASSESSMENT:  Good tolerance with Candice, improving ROM    Treatment/Activity Tolerance:  [x] Patient tolerated treatment well [] Patient limited by fatique  [] Patient limited by pain  [] Patient limited by other medical complications  [] Other:     Prognosis: [x] Good [] Fair  [] Poor    Patient Requires Follow-up: [x] Yes  [] No    PLAN:   [x] Continue per plan of care [] Alter current plan (see comments)  [] Plan of care initiated [] Hold pending MD visit [] Discharge    Electronically signed by: Lalito Baez PT

## 2019-06-27 ENCOUNTER — HOSPITAL ENCOUNTER (OUTPATIENT)
Dept: PHYSICAL THERAPY | Age: 59
Setting detail: THERAPIES SERIES
Discharge: HOME | End: 2019-06-27
Payer: MEDICARE

## 2019-06-27 PROCEDURE — G0283 ELEC STIM OTHER THAN WOUND: HCPCS | Performed by: SPECIALIST

## 2019-06-27 PROCEDURE — 97110 THERAPEUTIC EXERCISES: CPT | Performed by: SPECIALIST

## 2019-06-27 PROCEDURE — 97112 NEUROMUSCULAR REEDUCATION: CPT | Performed by: SPECIALIST

## 2019-06-27 PROCEDURE — 97140 MANUAL THERAPY 1/> REGIONS: CPT | Performed by: SPECIALIST

## 2019-06-27 NOTE — FLOWSHEET NOTE
[address] [place] [address] [facility]    Physical Therapy Daily Treatment Note  Date:  2019    Patient Name:  Dagmar Alejandre    :  1960  MRN: [id number]  Restrictions/Precautions:  none  Medical/Treatment Diagnosis Information:  ·   P73.703 (ICD-10-CM) - Status post total left knee replacement     Insurance/Certification information:   [facility]  Physician Information:   Dr Regla Magana of care signed (Y/N):     Date of Patient follow up with Physician:     G-Code (if applicable):      Date G-Code Applied:  19    LEFS  88%    Progress Note: [x]  Yes  []  No  Next due by: Visit #10       Latex Allergy:  [x]NO      []YES  Preferred Language for Healthcare:   [x]English       []other:    Visit # Insurance Allowable   3 MC, 80/20     Pain level:  5/10     SUBJECTIVE:  Compliance with HEP    OBJECTIVE: See eval  Observation:   Test measurements:    19: AROM 100(-3)  PROM 110-0  19: AROM(-1) 104  PROM 118-0      RESTRICTIONS/PRECAUTIONS: Patient does not read/write    Exercises/Interventions:     Therapeutic Ex Sets/sec Reps Notes HEP    x        x                 Bike ROM 5 min     Prone knee hang  2 min            HR/TR  10x  x   SLS  1 min  x   Slant board  1 min            LP/uni 80/40 20x     REAL  NV     Quad/ham machine   NV            Manual Intervention       Gentle ROM  8 min                                        NMR re-education                                                               ESU/CP  10 min         Therapeutic Exercise and NMR EXR  [x] (43483) Provided verbal/tactile cueing for activities related to strengthening, flexibility, endurance, ROM for improvements in LE, proximal hip, and core control with self care, mobility, lifting, ambulation.  [] () Provided verbal/tactile cueing for activities related to improving balance, coordination, kinesthetic sense, posture, motor skill, proprioception  to assist with LE, proximal hip, and core control in self care, mobility, lifting, ambulation and eccentric single leg control. NMR and Therapeutic Activities:    [x] (10406 or 54700) Provided verbal/tactile cueing for activities related to improving balance, coordination, kinesthetic sense, posture, motor skill, proprioception and motor activation to allow for proper function of core, proximal hip and LE with self care and ADLs  [] (46977) Gait Re-education- Provided training and instruction to the patient for proper LE, core and proximal hip recruitment and positioning and eccentric body weight control with ambulation re-education including up and down stairs     Home Exercise Program:    [x] (85600) Reviewed/Progressed HEP activities related to strengthening, flexibility, endurance, ROM of core, proximal hip and LE for functional self-care, mobility, lifting and ambulation/stair navigation   [] (34465)Reviewed/Progressed HEP activities related to improving balance, coordination, kinesthetic sense, posture, motor skill, proprioception of core, proximal hip and LE for self care, mobility, lifting, and ambulation/stair navigation      Manual Treatments:  PROM / STM / Oscillations-Mobs:  G-I, II, III, IV (PA's, Inf., Post.)  [x] (78858) Provided manual therapy to mobilize LE, proximal hip and/or LS spine soft tissue/joints for the purpose of modulating pain, promoting relaxation,  increasing ROM, reducing/eliminating soft tissue swelling/inflammation/restriction, improving soft tissue extensibility and allowing for proper ROM for normal function with self care, mobility, lifting and ambulation.      Modalities:  ESU/CP    Charges:  Timed Code Treatment Minutes: 38   Total Treatment Minutes: 48     [] EVAL  [x] RV(03621) x  1   [] IONTO  [x] NMR (61784) x  1   [] VASO  [x] Manual (20044) x  1    [] Other:  [] TA x       [] Mech Traction (92434)  [] ES(attended) (49014)      [x] ES (un) (38874):     GOALS:    Patient stated goal: walking, yardwork    Therapist goals for Patient:   Short Term Goals: To be achieved in: 2 weeks  1. Independent in HEP and progression per patient tolerance, in order to prevent re-injury. 2. Patient will have a decrease in pain to facilitate improvement in movement, function, and ADLs as indicated by Functional Deficits. Long Term Goals: To be achieved in: 6 weeks  1. Disability index score of 44% or less for the LEFS to assist with reaching prior level of function. 2. Patient will demonstrate increased AROM to 120-0  to allow for proper joint functioning as indicated by patients Functional Deficits. 3. Patient will demonstrate an increase in Strength to good proximal hip strength and control, within 5lb HHD in LE to allow for proper functional mobility as indicated by patients Functional Deficits. 4. Patient will return to [facility] for  functional activities without increased symptoms or restriction. 5. Walk community distances with min to no limitations without AD (patient specific functional goal)       Progression Towards Functional goals:  [x] Patient is progressing as expected towards functional goals listed. [] Progression is slowed due to complexities listed. [] Progression has been slowed due to co-morbidities.   [] Plan just implemented, too soon to assess goals progression  [] Other:     ASSESSMENT:  Good tolerance with Candice, improving ROM    Treatment/Activity Tolerance:  [x] Patient tolerated treatment well [] Patient limited by fatique  [] Patient limited by pain  [] Patient limited by other medical complications  [] Other:     Prognosis: [x] Good [] Fair  [] Poor    Patient Requires Follow-up: [x] Yes  [] No    PLAN:   [x] Continue per plan of care [] Alter current plan (see comments)  [] Plan of care initiated [] Hold pending MD visit [] Discharge    Electronically signed by: Pablo Becker PT

## 2019-07-02 ENCOUNTER — HOSPITAL ENCOUNTER (OUTPATIENT)
Dept: PHYSICAL THERAPY | Age: 59
Setting detail: THERAPIES SERIES
Discharge: HOME | End: 2019-07-02
Payer: MEDICARE

## 2019-07-02 PROCEDURE — 97112 NEUROMUSCULAR REEDUCATION: CPT | Performed by: SPECIALIST

## 2019-07-02 PROCEDURE — G0283 ELEC STIM OTHER THAN WOUND: HCPCS | Performed by: SPECIALIST

## 2019-07-02 PROCEDURE — 97110 THERAPEUTIC EXERCISES: CPT | Performed by: SPECIALIST

## 2019-07-02 PROCEDURE — 97140 MANUAL THERAPY 1/> REGIONS: CPT | Performed by: SPECIALIST

## 2019-07-02 NOTE — FLOWSHEET NOTE
sense, posture, motor skill, proprioception  to assist with LE, proximal hip, and core control in self care, mobility, lifting, ambulation and eccentric single leg control. NMR and Therapeutic Activities:    [x] (06120 or 77820) Provided verbal/tactile cueing for activities related to improving balance, coordination, kinesthetic sense, posture, motor skill, proprioception and motor activation to allow for proper function of core, proximal hip and LE with self care and ADLs  [] (88344) Gait Re-education- Provided training and instruction to the patient for proper LE, core and proximal hip recruitment and positioning and eccentric body weight control with ambulation re-education including up and down stairs     Home Exercise Program:    [x] (90808) Reviewed/Progressed HEP activities related to strengthening, flexibility, endurance, ROM of core, proximal hip and LE for functional self-care, mobility, lifting and ambulation/stair navigation   [] (40072)Reviewed/Progressed HEP activities related to improving balance, coordination, kinesthetic sense, posture, motor skill, proprioception of core, proximal hip and LE for self care, mobility, lifting, and ambulation/stair navigation      Manual Treatments:  PROM / STM / Oscillations-Mobs:  G-I, II, III, IV (PA's, Inf., Post.)  [x] (56984) Provided manual therapy to mobilize LE, proximal hip and/or LS spine soft tissue/joints for the purpose of modulating pain, promoting relaxation,  increasing ROM, reducing/eliminating soft tissue swelling/inflammation/restriction, improving soft tissue extensibility and allowing for proper ROM for normal function with self care, mobility, lifting and ambulation.      Modalities:  ESU/CP    Charges:  Timed Code Treatment Minutes: 38   Total Treatment Minutes: 48     [] EVAL  [x] ZN(43459) x  1   [] IONTO  [x] NMR (76138) x  1   [] VASO  [x] Manual (06858) x  1    [] Other:  [] TA x       [] Mech Traction (07596)  [] ES(attended) (48562)

## 2019-07-03 ENCOUNTER — HOSPITAL ENCOUNTER (OUTPATIENT)
Dept: PHYSICAL THERAPY | Age: 59
Setting detail: THERAPIES SERIES
Discharge: HOME | End: 2019-07-03
Payer: MEDICARE

## 2019-07-03 PROCEDURE — G0283 ELEC STIM OTHER THAN WOUND: HCPCS | Performed by: SPECIALIST

## 2019-07-03 PROCEDURE — 97140 MANUAL THERAPY 1/> REGIONS: CPT | Performed by: SPECIALIST

## 2019-07-03 PROCEDURE — 97112 NEUROMUSCULAR REEDUCATION: CPT | Performed by: SPECIALIST

## 2019-07-03 PROCEDURE — 97110 THERAPEUTIC EXERCISES: CPT | Performed by: SPECIALIST

## 2019-07-07 ENCOUNTER — TELEPHONE (OUTPATIENT)
Dept: ORTHOPEDIC SURGERY | Age: 59
End: 2019-07-07

## 2019-07-07 DIAGNOSIS — Z96.652 STATUS POST TOTAL LEFT KNEE REPLACEMENT: ICD-10-CM

## 2019-07-10 ENCOUNTER — APPOINTMENT (OUTPATIENT)
Dept: PHYSICAL THERAPY | Age: 59
End: 2019-07-10
Payer: MEDICARE

## 2019-07-11 ENCOUNTER — OFFICE VISIT (OUTPATIENT)
Dept: ORTHOPEDIC SURGERY | Age: 59
End: 2019-07-11

## 2019-07-11 DIAGNOSIS — Z96.652 STATUS POST TOTAL LEFT KNEE REPLACEMENT: Primary | ICD-10-CM

## 2019-07-11 PROCEDURE — 99024 POSTOP FOLLOW-UP VISIT: CPT | Performed by: ORTHOPAEDIC SURGERY

## 2019-07-11 RX ORDER — OXYCODONE HYDROCHLORIDE AND ACETAMINOPHEN 5; 325 MG/1; MG/1
1 TABLET ORAL EVERY 6 HOURS PRN
Qty: 28 TABLET | Refills: 0 | Status: SHIPPED | OUTPATIENT
Start: 2019-07-11 | End: 2019-07-18

## 2019-07-17 ENCOUNTER — HOSPITAL ENCOUNTER (OUTPATIENT)
Dept: PHYSICAL THERAPY | Age: 59
Setting detail: THERAPIES SERIES
Discharge: HOME | End: 2019-07-17
Payer: MEDICARE

## 2019-07-17 PROCEDURE — 97112 NEUROMUSCULAR REEDUCATION: CPT | Performed by: SPECIALIST

## 2019-07-17 PROCEDURE — 97140 MANUAL THERAPY 1/> REGIONS: CPT | Performed by: SPECIALIST

## 2019-07-17 PROCEDURE — G0283 ELEC STIM OTHER THAN WOUND: HCPCS | Performed by: SPECIALIST

## 2019-07-17 PROCEDURE — 97110 THERAPEUTIC EXERCISES: CPT | Performed by: SPECIALIST

## 2019-07-17 NOTE — FLOWSHEET NOTE
[address] [place] [address] [facility]    Physical Therapy Daily Treatment Note  Date:  2019    Patient Name:  Shayna Guzman    :  1960  MRN: [id number]  Restrictions/Precautions:  none  Medical/Treatment Diagnosis Information:  ·   E00.549 (ICD-10-CM) - Status post total left knee replacement     Insurance/Certification information:   [address] [employer]  Physician Information:   Dr Gokul Andino of care signed (Y/N):     Date of Patient follow up with Physician:     G-Code (if applicable):      Date G-Code Applied:  19    LEFS  88%    Progress Note: [x]  Yes  []  No  Next due by: Visit #10       Latex Allergy:  [x]NO      []YES  Preferred Language for Healthcare:   [x]English       []other:    Visit # Insurance Allowable   6 MC, 80/20     Pain level:  4/10     SUBJECTIVE:  Compliance with HEP    OBJECTIVE: See eval  Observation:   Test measurements:    19: AROM 100(-3)  PROM 110-0  19: AROM(-1) 104  PROM 118-0  19: AROM 102-0 PROM 120-0  7/3/19: AROM 110-0 PROM 123-0  : PROM 120-0  AROM 110    RESTRICTIONS/PRECAUTIONS: Patient does not read/write    Exercises/Interventions:     Therapeutic Ex Sets/sec Reps Notes HEP    x        x                 Bike ROM 5 min                 x   SLS blue foam  1 min  x   Slant board  1 min            LP/uni 100/50 25x     REAL abd/add/flex 30# 15x     Quad/ham machine uni 25#/25# 20x            Manual Intervention       Gentle ROM  8 min                                        NMR re-education       Step  Up and over 4\" 10x     LSD 4\" 10x                                               ESU/CP  10 min         Therapeutic Exercise and NMR EXR  [x] (13095) Provided verbal/tactile cueing for activities related to strengthening, flexibility, endurance, ROM for improvements in LE, proximal hip, and core control with self care, mobility, lifting, ambulation.  [] (92012) Provided verbal/tactile cueing for activities related to improving balance, coordination, kinesthetic sense, posture, motor skill, proprioception  to assist with LE, proximal hip, and core control in self care, mobility, lifting, ambulation and eccentric single leg control. NMR and Therapeutic Activities:    [x] (17654 or 16713) Provided verbal/tactile cueing for activities related to improving balance, coordination, kinesthetic sense, posture, motor skill, proprioception and motor activation to allow for proper function of core, proximal hip and LE with self care and ADLs  [] (48123) Gait Re-education- Provided training and instruction to the patient for proper LE, core and proximal hip recruitment and positioning and eccentric body weight control with ambulation re-education including up and down stairs     Home Exercise Program:    [x] (13541) Reviewed/Progressed HEP activities related to strengthening, flexibility, endurance, ROM of core, proximal hip and LE for functional self-care, mobility, lifting and ambulation/stair navigation   [] (50287)Reviewed/Progressed HEP activities related to improving balance, coordination, kinesthetic sense, posture, motor skill, proprioception of core, proximal hip and LE for self care, mobility, lifting, and ambulation/stair navigation      Manual Treatments:  PROM / STM / Oscillations-Mobs:  G-I, II, III, IV (PA's, Inf., Post.)  [x] (48947) Provided manual therapy to mobilize LE, proximal hip and/or LS spine soft tissue/joints for the purpose of modulating pain, promoting relaxation,  increasing ROM, reducing/eliminating soft tissue swelling/inflammation/restriction, improving soft tissue extensibility and allowing for proper ROM for normal function with self care, mobility, lifting and ambulation.      Modalities:  ESU/CP    Charges:  Timed Code Treatment Minutes: 38   Total Treatment Minutes: 48     [] EVAL  [x] KG(51869) x  1   [] IONTO  [x] NMR (87145) x  1   [] VASO  [x] Manual (20177) x  1    [] Other:  [] TA x       [] Mech Traction

## 2019-07-24 ENCOUNTER — APPOINTMENT (OUTPATIENT)
Dept: PHYSICAL THERAPY | Age: 59
End: 2019-07-24
Payer: MEDICARE

## 2019-12-19 ENCOUNTER — OFFICE VISIT (OUTPATIENT)
Dept: ORTHOPEDIC SURGERY | Age: 59
End: 2019-12-19
Payer: MEDICARE

## 2019-12-19 VITALS
DIASTOLIC BLOOD PRESSURE: 66 MMHG | WEIGHT: 186 LBS | HEART RATE: 48 BPM | HEIGHT: 60 IN | BODY MASS INDEX: 36.52 KG/M2 | SYSTOLIC BLOOD PRESSURE: 134 MMHG

## 2019-12-19 DIAGNOSIS — M25.561 RIGHT KNEE PAIN, UNSPECIFIED CHRONICITY: Primary | ICD-10-CM

## 2019-12-19 PROCEDURE — G8417 CALC BMI ABV UP PARAM F/U: HCPCS | Performed by: ORTHOPAEDIC SURGERY

## 2019-12-19 PROCEDURE — 99214 OFFICE O/P EST MOD 30 MIN: CPT | Performed by: ORTHOPAEDIC SURGERY

## 2019-12-19 PROCEDURE — G8484 FLU IMMUNIZE NO ADMIN: HCPCS | Performed by: ORTHOPAEDIC SURGERY

## 2019-12-19 PROCEDURE — 3017F COLORECTAL CA SCREEN DOC REV: CPT | Performed by: ORTHOPAEDIC SURGERY

## 2019-12-19 PROCEDURE — 1036F TOBACCO NON-USER: CPT | Performed by: ORTHOPAEDIC SURGERY

## 2019-12-19 PROCEDURE — G8427 DOCREV CUR MEDS BY ELIG CLIN: HCPCS | Performed by: ORTHOPAEDIC SURGERY

## 2020-01-23 DIAGNOSIS — M25.561 RIGHT KNEE PAIN, UNSPECIFIED CHRONICITY: Primary | ICD-10-CM

## 2020-01-27 ENCOUNTER — HOSPITAL ENCOUNTER (OUTPATIENT)
Dept: PHYSICAL THERAPY | Age: 60
Setting detail: THERAPIES SERIES
Discharge: HOME | End: 2020-01-27
Payer: MEDICARE

## 2020-01-27 PROCEDURE — 97110 THERAPEUTIC EXERCISES: CPT | Performed by: PHYSICAL THERAPIST

## 2020-01-27 PROCEDURE — 97161 PT EVAL LOW COMPLEX 20 MIN: CPT | Performed by: PHYSICAL THERAPIST

## 2020-01-27 NOTE — DISCHARGE SUMMARY
[address] [place], [address] [address]    Physical Therapy Daily Treatment Note  Date:  2020    Patient Name:  Zelalem Ramirez    :  1960  MRN: [id number]  Restrictions/Precautions:    Medical/Treatment Diagnosis Information:    M17.11 R knee OA      ·   F45.714 R knee pain    ·    Insurance/Certification information:     Physician Information:   christian  Plan of care signed (Y/N):     Date of Patient follow up with Physician:     G-Code (if applicable):      Date G-Code Applied:     lefs 60%    Progress Note: [x]  Yes  []  No  Next due by: Visit #10       Latex Allergy:  [x]NO      []YES  Preferred Language for Healthcare:   [x]English       []other:    Visit # Insurance Allowable   1      Pain level:  6/10     SUBJECTIVE:  See eval    OBJECTIVE: See eval  Observation:   Test measurements:      RESTRICTIONS/PRECAUTIONS: none    Exercises/Interventions:     Therapeutic Ex Sets/reps Notes        Seated HS stretch 3x30 sec HEP   Seated calf stretch 3x30 sec HEP   Seated QS BLEs 10 sec 10x HEP   SLR FLX supine 1x10 HEP   Seated heelslide with strap 10 sec 10x HEP   LAQ  1x10 HEP   minisquats 1x10 HEP                                           Manual Intervention                                   NMR re-education                                                      Therapeutic Exercise and NMR EXR  [x] (04944) Provided verbal/tactile cueing for activities related to strengthening, flexibility, endurance, ROM for improvements in LE, proximal hip, and core control with self care, mobility, lifting, ambulation.  [] (36797) Provided verbal/tactile cueing for activities related to improving balance, coordination, kinesthetic sense, posture, motor skill, proprioception  to assist with LE, proximal hip, and core control in self care, mobility, lifting, ambulation and eccentric single leg control.      NMR and Therapeutic Activities:    [] (06662 or 55643) Provided verbal/tactile cueing for activities related to improving balance, coordination, kinesthetic sense, posture, motor skill, proprioception and motor activation to allow for proper function of core, proximal hip and LE with self care and ADLs  [] (43109) Gait Re-education- Provided training and instruction to the patient for proper LE, core and proximal hip recruitment and positioning and eccentric body weight control with ambulation re-education including up and down stairs     Home Exercise Program:    [x] (79850) Reviewed/Progressed HEP activities related to strengthening, flexibility, endurance, ROM of core, proximal hip and LE for functional self-care, mobility, lifting and ambulation/stair navigation   [] (29396)Reviewed/Progressed HEP activities related to improving balance, coordination, kinesthetic sense, posture, motor skill, proprioception of core, proximal hip and LE for self care, mobility, lifting, and ambulation/stair navigation      Manual Treatments:  PROM / STM / Oscillations-Mobs:  G-I, II, III, IV (PA's, Inf., Post.)  [] (40648) Provided manual therapy to mobilize LE, proximal hip and/or LS spine soft tissue/joints for the purpose of modulating pain, promoting relaxation,  increasing ROM, reducing/eliminating soft tissue swelling/inflammation/restriction, improving soft tissue extensibility and allowing for proper ROM for normal function with self care, mobility, lifting and ambulation.      Modalities:      Charges:  Timed Code Treatment Minutes: 20   Total Treatment Minutes: 40     [x] EVAL (LOW) 38841 (typically 20 minutes face-to-face)  [] EVAL (MOD) 07890 (typically 30 minutes face-to-face)  [] EVAL (HIGH) 96725 (typically 45 minutes face-to-face)  [] RE-EVAL     [x] HG(69511) x      [] IONTO  [] NMR (96597) x      [] VASO  [] Manual (12058) x       [] Other:  [] TA x       [] Mech Traction (84010)  [] ES(attended) (22285)      [] ES (un) (64034): GOALS:   Patient stated goal: To be prepared for surgery      Therapist goals for Patient:    Short Term Goals: To be achieved in: 1 weeks  1. Independent in HEP and progression per patient tolerance, in order to prevent re-injury and to prepare for surgery by strength and flexibility therex . Progression Towards Functional goals:  [] Patient is progressing as expected towards functional goals listed. [] Progression is slowed due to complexities listed. [] Progression has been slowed due to co-morbidities.   [x] Plan just implemented, too soon to assess goals progression  [] Other:     ASSESSMENT:  See eval    Treatment/Activity Tolerance:  [] Patient tolerated treatment well [] Patient limited by fatique  [x] Patient limited by pain  [] Patient limited by other medical complications  [] Other:     Prognosis: [x] Good [] Fair  [] Poor          Patient Requires Follow-up: [] Yes  [] No    PLAN: See eval       [] Continue per plan of care [] Alter current plan (see comments)  [] Plan of care initiated [x] Discharge     Electronically signed by: Ihsan Robertson PT

## 2020-01-27 NOTE — PLAN OF CARE
rehabilitation):   [x]None           Arthritic conditions   []Rheumatoid arthritis (M05.9)  []Osteoarthritis (M19.91)   Cardiovascular conditions   []Hypertension (I10)  []Hyperlipidemia (E78.5)  []Angina pectoris (I20)  []Atherosclerosis (I70)   Musculoskeletal conditions   []Disc pathology   []Congenital spine pathologies   []Prior surgical intervention  []Osteoporosis (M81.8)  []Osteopenia (M85.8)   Endocrine conditions   []Hypothyroid (E03.9)  []Hyperthyroid Gastrointestinal conditions   []Constipation (C30.61)   Metabolic conditions   []Morbid obesity (E66.01)  []Diabetes type 1(E10.65) or 2 (E11.65)   []Neuropathy (G60.9)     Pulmonary conditions   []Asthma (J45)  []Coughing   []COPD (J44.9)   Psychological Disorders  []Anxiety (F41.9)  []Depression (F32.9)   []Other:   []Other:          Barriers to/and or personal factors that will affect rehab potential:              []Age  []Sex              []Motivation/Lack of Motivation                        []Co-Morbidities              []Cognitive Function, education/learning barriers              []Environmental, home barriers              []profession/work barriers  []past PT/medical experience  []other:  Justification:     Falls Risk Assessment (30 days): 0  [x] Falls Risk assessed and no intervention required.   [] Falls Risk assessed and Patient requires intervention due to being higher risk   TUG score (>12s at risk):     [] Falls education provided, including       G-Codes:       ASSESSMENT:   Functional Impairments:     []Noted lumbar/proximal hip/LE joint hypomobility   [x]Decreased LE functional ROM   []Decreased core/proximal hip strength and neuromuscular control   []Decreased LE functional strength   [x]Reduced balance/proprioceptive control   []other:      Functional Activity Limitations (from functional questionnaire and intake)   []Reduced ability to tolerate prolonged functional positions   [x]Reduced ability or difficulty with changes of positions or transfers between positions   []Reduced ability to maintain good posture and demonstrate good body mechanics with sitting, bending, and lifting   []Reduced ability to sleep   [x] Reduced ability or tolerance with driving and/or computer work   []Reduced ability to perform lifting, carrying tasks   [x]Reduced ability to squat   []Reduced ability to forward bend   [x]Reduced ability to ambulate prolonged functional periods/distances/surfaces   [x]Reduced ability to ascend/descend stairs   []Reduced ability to run, hop, cut or jump   []other:    Participation Restrictions   []Reduced participation in self care activities   [x]Reduced participation in home management activities   []Reduced participation in work activities   [x]Reduced participation in social activities. []Reduced participation in sport/recreation activities. Classification :    []Signs/symptoms consistent with post-surgical status including decreased ROM, strength and function.    []Signs/symptoms consistent with joint sprain/strain   []Signs/symptoms consistent with patella-femoral syndrome   [x]Signs/symptoms consistent with knee OA/hip OA   []Signs/symptoms consistent with internal derangement of knee/Hip   []Signs/symptoms consistent with functional hip weakness/NMR control      []Signs/symptoms consistent with tendinitis/tendinosis    []signs/symptoms consistent with pathology which may benefit from Dry needling      []other:      Prognosis/Rehab Potential:      []Excellent   [x]Good    []Fair   []Poor    Tolerance of evaluation/treatment:    []Excellent   [x]Good    []Fair   []Poor    Physical Therapy Evaluation Complexity Justification  [x] A history of present problem with:  [x] no personal factors and/or comorbidities that impact the plan of care;  []1-2 personal factors and/or comorbidities that impact the plan of care  []3 personal factors and/or comorbidities that impact the plan of care  [x] An examination of body systems using standardized tests and measures addressing any of the following: body structures and functions (impairments), activity limitations, and/or participation restrictions;:  [] a total of 1-2 or more elements   [x] a total of 3 or more elements   [] a total of 4 or more elements   [x] A clinical presentation with:  [x] stable and/or uncomplicated characteristics   [] evolving clinical presentation with changing characteristics  [] unstable and unpredictable characteristics;   [x] Clinical decision making of [x] low, [] moderate, [] high complexity using standardized patient assessment instrument and/or measurable assessment of functional outcome. [x] EVAL (LOW) 20907 (typically 20 minutes face-to-face)  [] EVAL (MOD) 03703 (typically 30 minutes face-to-face)  [] EVAL (HIGH) 16051 (typically 45 minutes face-to-face)  [] RE-EVAL     PLAN: has reached max potential in rehab    Frequency/Duration:  1 days per week for 1 Weeks:  Interventions:  [x]  Therapeutic exercise including: strength training, ROM, for Lower extremity and core   [x]  NMR activation and proprioception for LE, Glutes and Core   [x]  Manual therapy as indicated for LE, Hip and spine to include: Dry Needling/IASTM, STM, PROM, Gr I-IV mobilizations, manipulation. [x] Modalities as needed that may include: thermal agents, E-stim, Biofeedback, US, iontophoresis as indicated  [x] Patient education on joint protection, postural re-education, activity modification, progression of HEP. HEP instruction: (see scanned forms)    GOALS:  Patient stated goal: sx    Therapist goals for Patient:   Short Term Goals: To be achieved in: 2 weeks  1. Independent in HEP and progression per patient tolerance, in order to prevent re-injury. [] Progressing: [x] Met: [] Not Met: [] Adjusted     2. Patient will have a decrease in pain to facilitate improvement in movement, function, and ADLs as indicated by Functional Deficits.   [] Progressing: [] Met: [x] Not Met: [] Adjusted           Electronically signed by:  Mike Marquez, PT

## 2020-02-04 ENCOUNTER — HOSPITAL ENCOUNTER (OUTPATIENT)
Dept: CT IMAGING | Age: 60
Discharge: HOME | End: 2020-02-04
Payer: MEDICARE

## 2020-02-04 PROCEDURE — 73700 CT LOWER EXTREMITY W/O DYE: CPT

## 2020-02-06 LAB
ABO GROUPING: NORMAL
ANTIBODY SCREEN: NEGATIVE
RH FACTOR: POSITIVE

## 2020-02-12 RX ORDER — CYCLOBENZAPRINE HCL 5 MG
5 TABLET ORAL 2 TIMES DAILY PRN
Qty: 30 TABLET | Refills: 0 | Status: SHIPPED | OUTPATIENT
Start: 2020-02-12 | End: 2020-03-04

## 2020-02-17 ENCOUNTER — HOSPITAL ENCOUNTER (OUTPATIENT)
Dept: PHYSICAL THERAPY | Age: 60
Setting detail: THERAPIES SERIES
Discharge: HOME | End: 2020-02-17
Payer: MEDICARE

## 2020-02-19 ENCOUNTER — HOSPITAL ENCOUNTER (OUTPATIENT)
Dept: PHYSICAL THERAPY | Age: 60
Setting detail: THERAPIES SERIES
Discharge: HOME | End: 2020-02-19
Payer: MEDICARE

## 2020-02-19 PROCEDURE — 97110 THERAPEUTIC EXERCISES: CPT

## 2020-02-19 PROCEDURE — 97161 PT EVAL LOW COMPLEX 20 MIN: CPT

## 2020-02-19 PROCEDURE — 97112 NEUROMUSCULAR REEDUCATION: CPT

## 2020-02-19 NOTE — FLOWSHEET NOTE
[facility], [address] [place] [address], [address] [place] [address] [address]  Phone: [phone]   Fax:     [phone]      Physical Therapy Treatment Note/ Progress Report:       Date:  2020    Date:  2020 date of service    Patient Name:  Luann Mercer    :  1960  MRN: [id number]  Restrictions/Precautions:    Medical/Treatment Diagnosis Information:  Diagnosis: M17.11 (ICD-10-CM) - Primary osteoarthritis of right knee  Treatment Diagnosis: S/p R TKR DOS 2/10/20 , R knee movement dysfunction and pain   Insurance/Certification information:  PT Insurance Information: Medicare, $0 CP,  Med Nec, No Auth Req  Physician Information:  Referring Practitioner: Windell Babinski, MD  Has the plan of care been signed (Y/N):        []  Yes  [x]  No     Date of Patient follow up with Physician: 20      Is this a Progress Report:     []  Yes  [x]  No        If Yes:  Date Range for reporting period:  Beginning 20  Ending 3/19/20    Progress report will be due (10 Rx or 30 days whichever is less): 3/46/75      Recertification will be due (POC Duration  / 90 days whichever is less): 20      Visit # Insurance Allowable Auth Required   1 Med Nec []  Yes [x]  No        Functional Scale: 94%   Date assessed:  20      Latex Allergy:  [x]NO      []YES  Preferred Language for Healthcare:   [x]English       []other:      Pain level:  8/10     SUBJECTIVE:  See eval    OBJECTIVE: See eval   Observation:    Test measurements:      RESTRICTIONS/PRECAUTIONS: HTN, Diabetes (decreased sensation in R heel and distal leg), previous ulcer on R calf with decreased skin intergrity    Exercises/Interventions:   Therapeutic Ex (25429) Sets/sec Reps Notes/CUES HEP   Quad set with towel  2 10  x   Heel slides 2 10  x   Hamstring stretch with towel under ankle 5\" 10  x   SLR  2 10     Seated hip ABD 2 10 LTB x   Heel raises 2 10  x   Mini squat from chair 2 10 mobility, lifting, and ambulation/stair navigation      Manual Treatments:  PROM / STM / Oscillations-Mobs:  G-I, II, III, IV (PA's, Inf., Post.)  [x] (07387) Provided manual therapy to mobilize LE, proximal hip and/or LS spine soft tissue/joints for the purpose of modulating pain, promoting relaxation, increasing ROM, reducing/eliminating soft tissue swelling/inflammation/restriction, improving soft tissue extensibility and allowing for proper ROM for normal function with self-care, mobility, lifting and ambulation. Modalities:     [x] GAME READY (VASO)- for significant edema, swelling, pain control. Charges:  Timed Code Treatment Minutes: 30   Total Treatment Minutes: 50      [x] EVAL (LOW) 53750 (typically 20 minutes face-to-face)  [] EVAL (MOD) 32282 (typically 30 minutes face-to-face)  [] EVAL (HIGH) 71578 (typically 45 minutes face-to-face)  [] RE-EVAL     [x] BARRIOS(59571) x 1    [] IONTO  [x] NMR (38239) x  1   [] VASO  [] Manual (89627) x     [] Other:  [] TA x      [] Mech Traction (66314)  [] ES(attended) (31294)      [] ES (un) (93301):    ASSESSMENT:  See eval    GOALS:   GOALS:  Patient stated goal: walk without pain and get back to normal work duties. Therapist goals for Patient:   Short Term Goals: To be achieved in: 2 weeks  1. Independent in HEP and progression per patient tolerance, in order to prevent re-injury. [x] Progressing: [] Met: [] Not Met: [] Adjusted     2. Patient will have a decrease in pain to facilitate improvement in movement, function, and ADLs as indicated by Functional Deficits. [x] Progressing: [] Met: [] Not Met: [] Adjusted     Long Term Goals: To be achieved in: 12 weeks  1. Disability index score of 40% or less for the LEFS to assist with reaching prior level of function. [] Progressing: [] Met: [] Not Met: [] Adjusted     2.  Patient will demonstrate increased AROM to at least equal to the L knee ROM to allow for proper joint functioning as indicated by patients Functional Deficits. [x] Progressing: [] Met: [] Not Met: [] Adjusted     3. Patient will demonstrate an increase in Strength to good proximal hip strength and control, within 5lb HHD in LE to allow for proper functional mobility as indicated by patients Functional Deficits. [x] Progressing: [] Met: [] Not Met: [] Adjusted     4. Patient will return to all functional activities without increased symptoms or restriction. [x] Progressing: [] Met: [] Not Met: [] Adjusted     5. Pt will be able to ambulate without an AD with 0/10 pain and good heel to toe gait mechanics. [x] Progressing: [] Met: [] Not Met: [] Adjusted        Overall Progression Towards Functional goals/ Treatment Progress Update:  [] Patient is progressing as expected towards functional goals listed. [] Progression is slowed due to complexities/Impairments listed. [] Progression has been slowed due to co-morbidities.   [x] Plan just implemented, too soon to assess goals progression <30days   [] Goals require adjustment due to lack of progress  [] Patient is not progressing as expected and requires additional follow up with physician  [] Other    Prognosis for POC: [x] Good [] Fair  [] Poor      Patient requires continued skilled intervention: [x] Yes  [] No    Treatment/Activity Tolerance:  [x] Patient able to complete treatment  [] Patient limited by fatigue  [] Patient limited by pain    [] Patient limited by other medical complications  [] Other:     Return to Play: (if applicable)   []  Stage 1: Intro to Strength   []  Stage 2: Return to Run and Strength   []  Stage 3: Return to Jump and Strength   []  Stage 4: Dynamic Strength and Agility   []  Stage 5: Sport Specific Training     []  Ready to Return to Play, Meets All Above Stages   []  Not Ready for Return to Sports   Comments:                          PLAN: See eval  [] Continue per plan of care [] Alter current plan (see comments above)  [x] Plan of care initiated [] Hold

## 2020-02-19 NOTE — PLAN OF CARE
[facility], [facility], [address] [facility] [address]  Phone: [phone]   Fax:     [phone]       Physical Therapy Certification    Dear Referring Practitioner: Rosanna Post MD,    We had the pleasure of evaluating the following patient for physical therapy services at [address] [place] [address]. A summary of our findings can be found in the initial assessment below. This includes our plan of care. If you have any questions or concerns regarding these findings, please do not hesitate to contact me at the office phone number checked above. Thank you for the referral.       Physician Signature:_______________________________Date:__________________  By signing above (or electronic signature), therapists plan is approved by physician      Patient: Haroon Carter   : 1960   MRN: [id number]  Referring Physician: Referring Practitioner: Rosanna Post MD      Evaluation Date: 2020      Medical Diagnosis Information:  Diagnosis: M17.11 (ICD-10-CM) - Primary osteoarthritis of right knee   Treatment Diagnosis: S/p R TKR with movement dysfunction and pain                                         Insurance information: PT Insurance Information: Medicare, $0 CP, 80/20 Med Nec, No Auth Req     Precautions/ Contra-indications: Diabetes, OA, HTN  Latex Allergy:  [x]NO      []YES  Preferred Language for Healthcare:   [x]English       []other:    SUBJECTIVE: Pt reports R knee pain s/p R TKR DOS (2/10/20). Pt states his knee has been very painful since his surgery. Pt states he has been using his rolling walker for ambulation in and outside his sister's house.      Relevant Medical History: ADD, previous L knee replacement, Diabetes, OA, HTN  Functional Disability Index:  LEFS: 94%     Pain Scale: 8-9/10  Easing factors: Taking pain medication can decrease his pain 1-2/10  Provocative factors: Walking, bending, navigating stairs    Type: [x]Constant   [x]Intermittent  []Radiating []Localized []other:     Numbness/Tingling: numbness and tingling in L heel and leg, pt states he has had this numbness before his surgery but it has increased since his surgery. PT educated on using mirror to regularly check for any skin breakdown on his feet. Pt verbalized understanding. Occupation/School: Pt states he is disabled. Living Status/Prior Level of Function: Independent with ADLs and IADLs, Pt lives with his sister and she helps with cooking and cleaning    OBJECTIVE:     ROM LEFT RIGHT   HIP Flex     HIP Abd     HIP Ext     HIP IR     HIP ER     Knee ext 75 degrees 100 degrees   Knee Flex -5 degrees -10 degrees   Ankle PF     Ankle DF     Ankle In     Ankle Ev     Strength  LEFT RIGHT   HIP Flexors 4/5 4/5   HIP Abductors 4-/5 4-/5   HIP Ext     Hip ER     Knee EXT (quad) 4/5 deferred   Knee Flex (HS) 4-/5 deferred   Ankle DF 4/5 4/5   Ankle PF 4/5 4/5   Ankle Inv     Ankle EV          Circumference  Mid apex  7 cm prox             Reflexes/Sensation:    [x]Dermatomes/Myotomes intact    [x]Reflexes equal and normal bilaterally   []Other:    Joint mobility: deferred   []Normal    []Hypo   []Hyper    Palpation: Pt tender to palpation over anterior medial and anterior lateral knee. Pt has some residual swelling in knee. Functional Mobility/Transfers: Pt has antalgic STS tranfers. Posture: Pt has rounded shoulders and forward head posture. Bandages/Dressings/Incisions: Pt's incision has some dried blood on the incision but otherwise it does not show multiple signs of infection. Pt does have some skin discoloration and some skin abrasions in his calf but pt states this was a condition he had prior to surgery. Gait: Pt ambulates with rolling walker and antalgic gait pattern of decreased stance R stance phase and increased L WB with trendelenburg gait pattern. Orthopedic Special Tests: deferred                       [x] Patient history, allergies, meds reviewed. Medical chart reviewed. See intake form. Review Of Systems (ROS):  [x]Performed Review of systems (Integumentary, CardioPulmonary, Neurological) by intake and observation. Intake form has been scanned into medical record. Patient has been instructed to contact their primary care physician regarding ROS issues if not already being addressed at this time. Co-morbidities/Complexities (which will affect course of rehabilitation):   []None           Arthritic conditions   []Rheumatoid arthritis (M05.9)  [x]Osteoarthritis (M19.91)   Cardiovascular conditions   [x]Hypertension (I10)  []Hyperlipidemia (E78.5)  []Angina pectoris (I20)  []Atherosclerosis (I70)   Musculoskeletal conditions   []Disc pathology   []Congenital spine pathologies   []Prior surgical intervention  []Osteoporosis (M81.8)  []Osteopenia (M85.8)   Endocrine conditions   []Hypothyroid (E03.9)  []Hyperthyroid Gastrointestinal conditions   []Constipation (H25.05)   Metabolic conditions   []Morbid obesity (E66.01)  [x]Diabetes type 1(E10.65) or 2 (E11.65)   []Neuropathy (G60.9)     Pulmonary conditions   []Asthma (J45)  []Coughing   []COPD (J44.9)   Psychological Disorders  []Anxiety (F41.9)  []Depression (F32.9)   []Other:   [x]Other:  ADD        Barriers to/and or personal factors that will affect rehab potential:              []Age  []Sex              []Motivation/Lack of Motivation                        [x]Co-Morbidities              [x]Cognitive Function, education/learning barriers              []Environmental, home barriers              []profession/work barriers  []past PT/medical experience  []other:  Justification:     Falls Risk Assessment (30 days): will reassess next visit  [] Falls Risk assessed and no intervention required.   [] Falls Risk assessed and Patient requires intervention due to being higher risk   TUG score (>12s at risk):     [] Falls education provided, including       ASSESSMENT: Pt is a 61 yo male who presents to PT 9 days s/p R TKR (2/10/20). Pt presents with increased pain, ROM and strength deficits, and an antalgic gait pattern. Pt will benefit from skilled PT to increase strength, increase ROM, decrease pain, and improve gait mechanics to increase pt's functional independence. Functional Impairments:     [x]Noted lumbar/proximal hip/LE joint hypomobility   [x]Decreased LE functional ROM   [x]Decreased core/proximal hip strength and neuromuscular control   [x]Decreased LE functional strength   [x]Reduced balance/proprioceptive control   []other:      Functional Activity Limitations (from functional questionnaire and intake)   []Reduced ability to tolerate prolonged functional positions   [x]Reduced ability or difficulty with changes of positions or transfers between positions   [x]Reduced ability to maintain good posture and demonstrate good body mechanics with sitting, bending, and lifting   [x]Reduced ability to sleep   [x] Reduced ability or tolerance with driving and/or computer work   [x]Reduced ability to perform lifting, carrying tasks   [x]Reduced ability to squat   [x]Reduced ability to forward bend   [x]Reduced ability to ambulate prolonged functional periods/distances/surfaces   [x]Reduced ability to ascend/descend stairs   [x]Reduced ability to run, hop, cut or jump   []other:    Participation Restrictions   [x]Reduced participation in self care activities   [x]Reduced participation in home management activities   []Reduced participation in work activities   [x]Reduced participation in social activities. []Reduced participation in sport/recreation activities. Classification :    [x]Signs/symptoms consistent with post-surgical status including decreased ROM, strength and function.    []Signs/symptoms consistent with joint sprain/strain   []Signs/symptoms consistent with patella-femoral SPT

## 2020-02-20 RX ORDER — OXYCODONE HYDROCHLORIDE AND ACETAMINOPHEN 5; 325 MG/1; MG/1
1 TABLET ORAL EVERY 6 HOURS PRN
Qty: 28 TABLET | Refills: 0 | Status: SHIPPED | OUTPATIENT
Start: 2020-02-20 | End: 2020-02-27

## 2020-02-24 ENCOUNTER — OFFICE VISIT (OUTPATIENT)
Dept: ORTHOPEDIC SURGERY | Age: 60
End: 2020-02-24

## 2020-02-24 PROCEDURE — 99024 POSTOP FOLLOW-UP VISIT: CPT | Performed by: PHYSICIAN ASSISTANT

## 2020-02-26 ENCOUNTER — HOSPITAL ENCOUNTER (OUTPATIENT)
Dept: PHYSICAL THERAPY | Age: 60
Setting detail: THERAPIES SERIES
Discharge: HOME | End: 2020-02-26
Payer: MEDICARE

## 2020-02-26 PROCEDURE — 97110 THERAPEUTIC EXERCISES: CPT

## 2020-02-26 PROCEDURE — 97016 VASOPNEUMATIC DEVICE THERAPY: CPT

## 2020-02-26 PROCEDURE — 97112 NEUROMUSCULAR REEDUCATION: CPT

## 2020-02-26 NOTE — FLOWSHEET NOTE
Ladders    Square    Jump/Hop  Low                       Med.                       High            Modality Vaso 10 minutes   PTA Assessment Slow moving today. Fatigued with standing hip ex. Cues for sequencing with step ups.     Time Based Treatment 20 minutes     Electronically signed by: Nolberto oTbar PTA

## 2020-02-26 NOTE — FLOWSHEET NOTE
from chair 3 10  x   LAQ 3 10     Bike NV 10'      gastroc stretch NV 30\" 3     Step ups NV              Manual Intervention (73715)                                                 NMR re-education (76304)   CUES NEEDED           Balance training NV                                                        Therapeutic Activity (18844)       Stair training 4 mins  Cues for up with good and down with bad                                           Therapeutic Exercise and NMR EXR  [x] (94366) Provided verbal/tactile cueing for activities related to strengthening, flexibility, endurance, ROM for improvements in LE, proximal hip, and core control with self care, mobility, lifting, ambulation. [x] (66878) Provided verbal/tactile cueing for activities related to improving balance, coordination, kinesthetic sense, posture, motor skill, proprioception to assist with LE, proximal hip, and core control in self-care, mobility, lifting, ambulation and eccentric single leg control.      NMR and Therapeutic Activities:    [x] (66678 or 64097) Provided verbal/tactile cueing for activities related to improving balance, coordination, kinesthetic sense, posture, motor skill, proprioception and motor activation to allow for proper function of core, proximal hip and LE with self-care and ADLs and functional mobility.   [] (29370) Gait Re-education- Provided training and instruction to the patient for proper LE, core and proximal hip recruitment and positioning and eccentric body weight control with ambulation re-education including up and down stairs     Home Exercise Program:    [x] (83774) Reviewed/Progressed HEP activities related to strengthening, flexibility, endurance, ROM of core, proximal hip and LE for functional self-care, mobility, lifting and ambulation/stair navigation   [] (99352) Reviewed/Progressed HEP activities related to improving balance, coordination, kinesthetic sense, posture, motor skill, proprioception of core, proximal hip and LE for self-care, mobility, lifting, and ambulation/stair navigation      Manual Treatments:  PROM / STM / Oscillations-Mobs:  G-I, II, III, IV (PA's, Inf., Post.)  [x] (56912) Provided manual therapy to mobilize LE, proximal hip and/or LS spine soft tissue/joints for the purpose of modulating pain, promoting relaxation, increasing ROM, reducing/eliminating soft tissue swelling/inflammation/restriction, improving soft tissue extensibility and allowing for proper ROM for normal function with self-care, mobility, lifting and ambulation. Modalities:     [x] GAME READY (VASO)- for significant edema, swelling, pain control. Charges:  Timed Code Treatment Minutes: 40   Total Treatment Minutes: 50      [] EVAL (LOW) 12938 (typically 20 minutes face-to-face)  [] EVAL (MOD) 77700 (typically 30 minutes face-to-face)  [] EVAL (HIGH) 01981 (typically 45 minutes face-to-face)  [] RE-EVAL     [x] NQ(51375) x 2    [] IONTO  [x] NMR (70112) x  1   [x] VASO  [] Manual (81949) x     [] Other:  [] TA x      [] Mech Traction (84891)  [] ES(attended) (04047)      [] ES (un) (72286):    ASSESSMENT:  Pt wilmar session well. Demonstrated some erratic behavior with exercises needed cues to slow down rate of exercises. GOALS:     Patient stated goal: walk without pain and get back to normal work duties. Therapist goals for Patient:   Short Term Goals: To be achieved in: 2 weeks  1. Independent in HEP and progression per patient tolerance, in order to prevent re-injury. [x] Progressing: [] Met: [] Not Met: [] Adjusted     2. Patient will have a decrease in pain to facilitate improvement in movement, function, and ADLs as indicated by Functional Deficits. [x] Progressing: [] Met: [] Not Met: [] Adjusted     Long Term Goals: To be achieved in: 12 weeks  1. Disability index score of 40% or less for the LEFS to assist with reaching prior level of function. [] Progressing: [] Met: [] Not Met: [] Adjusted     2. with most recent update on progress.

## 2020-02-28 ENCOUNTER — HOSPITAL ENCOUNTER (OUTPATIENT)
Dept: PHYSICAL THERAPY | Age: 60
Setting detail: THERAPIES SERIES
Discharge: HOME | End: 2020-02-28
Payer: MEDICARE

## 2020-02-28 PROCEDURE — 97110 THERAPEUTIC EXERCISES: CPT

## 2020-02-28 PROCEDURE — 97112 NEUROMUSCULAR REEDUCATION: CPT

## 2020-02-28 PROCEDURE — 97016 VASOPNEUMATIC DEVICE THERAPY: CPT

## 2020-02-28 NOTE — FLOWSHEET NOTE
723 [facility] and 500 [facility]5 [facility] 3560 [facility], [address] [place] [address] [address]  Phone: [phone]   Fax:     [phone]      Physical Therapy Treatment Note/ Progress Report:       Date:  2020    Date:  2020 date of service    Patient Name:  Harjinder Ghosh    :  1960  MRN: [id number]  Restrictions/Precautions:    Medical/Treatment Diagnosis Information:  Diagnosis: M17.11 (ICD-10-CM) - Primary osteoarthritis of right knee  Treatment Diagnosis: S/p R TKR DOS 2/10/20 , R knee movement dysfunction and pain   Insurance/Certification information:  PT Insurance Information: Medicare, $0 CP,  Med Nec, No Auth Req  Physician Information:  Referring Practitioner: Mario Felix MD  Has the plan of care been signed (Y/N):        [x]  Yes  []  No     Date of Patient follow up with Physician: 20      Is this a Progress Report:     []  Yes  [x]  No        If Yes:  Date Range for reporting period:  Beginning 20  Ending 3/19/20    Progress report will be due (10 Rx or 30 days whichever is less):       Recertification will be due (POC Duration  / 90 days whichever is less): 20      Visit # Insurance Allowable Auth Required   3 Med Nec []  Yes [x]  No        Functional Scale: 94%   Date assessed:  20      Latex Allergy:  [x]NO      []YES  Preferred Language for Healthcare:   [x]English       []other:      Pain level:  5/10     SUBJECTIVE:  Pt states he has started walking with his cane in R hand.        OBJECTIVE:    Observation:    Test measurements: R knee PROM -5-90 deg    RESTRICTIONS/PRECAUTIONS: HTN, Diabetes (decreased sensation in R heel and distal leg), previous ulcer on R calf with decreased skin intergrity    Exercises/Interventions:   Therapeutic Ex (05497) Sets/sec Reps Notes/CUES HEP   Quad set with towel  2 10  x   Heel slides w/ strap 3 10  x   Hamstring stretch with towel under ankle 10\" 10  x    2 10      2 10 LTB x   Heel raises 3 10  x   Mini squat from chair 3 10 No UE assist x   LAQ 3 10     Bike  7'  Pt required max cues to decrease speed and decrease lateral WS with little carryover. Pt is able to complete full revolution. gastroc stretch  30\" 3     Standing hip ABD 3 10            Heel raises 3 10     Single leg stance  5\" 10     Step ups  3 10 4\", go to 6\" NV           Manual Intervention (01798)                                                 NMR re-education (41942)   CUES NEEDED           SL balance 5 sec 5  Pt demonstrated stepping strategy and decreased tolerance. Tandem stance 10-25 sec 5       Gait training with SC 6'  Pt required verbal and demonstrative cues to perform proper gait mechanics with cane in L hand. Pt had fair carryover initially but this improved somewhat with more practice. Therapeutic Activity (35439)                                               Gameready 10mins    Therapeutic Exercise and NMR EXR  [x] (96545) Provided verbal/tactile cueing for activities related to strengthening, flexibility, endurance, ROM for improvements in LE, proximal hip, and core control with self care, mobility, lifting, ambulation. [x] (62205) Provided verbal/tactile cueing for activities related to improving balance, coordination, kinesthetic sense, posture, motor skill, proprioception to assist with LE, proximal hip, and core control in self-care, mobility, lifting, ambulation and eccentric single leg control.      NMR and Therapeutic Activities:    [x] (01049 or 62075) Provided verbal/tactile cueing for activities related to improving balance, coordination, kinesthetic sense, posture, motor skill, proprioception and motor activation to allow for proper function of core, proximal hip and LE with self-care and ADLs and functional mobility.   [] (23382) Gait Re-education- Provided training and instruction to the patient for proper LE, core and proximal hip Patient able to complete treatment  [] Patient limited by fatigue  [] Patient limited by pain    [] Patient limited by other medical complications  [] Other:       PLAN: See eval  [x] Continue per plan of care [] Alter current plan (see comments above)  [] Plan of care initiated [] Hold pending MD visit [] Discharge    Electronically signed by:  Tello Vo PT Daphney Mas, SPT    Note: If patient does not return for scheduled/ recommended follow up visits, this note will serve as a discharge from care along with most recent update on progress.

## 2020-03-04 ENCOUNTER — HOSPITAL ENCOUNTER (OUTPATIENT)
Dept: PHYSICAL THERAPY | Age: 60
Setting detail: THERAPIES SERIES
Discharge: HOME | End: 2020-03-04
Payer: COMMERCIAL

## 2020-03-04 PROCEDURE — 97016 VASOPNEUMATIC DEVICE THERAPY: CPT

## 2020-03-04 PROCEDURE — 97110 THERAPEUTIC EXERCISES: CPT

## 2020-03-04 PROCEDURE — 97112 NEUROMUSCULAR REEDUCATION: CPT

## 2020-03-04 RX ORDER — OXYCODONE HYDROCHLORIDE 5 MG/1
TABLET ORAL
COMMUNITY
Start: 2020-02-10 | End: 2021-08-17

## 2020-03-04 RX ORDER — OXYCODONE HYDROCHLORIDE 5 MG/1
5 TABLET ORAL EVERY 6 HOURS PRN
Qty: 28 TABLET | Refills: 0 | Status: SHIPPED | OUTPATIENT
Start: 2020-03-04 | End: 2020-03-11

## 2020-03-04 RX ORDER — CYCLOBENZAPRINE HCL 5 MG
TABLET ORAL
Qty: 30 TABLET | Refills: 0 | Status: SHIPPED | OUTPATIENT
Start: 2020-03-04 | End: 2021-08-17

## 2020-03-04 NOTE — FLOWSHEET NOTE
723 [facility], [address] [place] [address], [address] [place] [address] [address]  Phone: [phone]   Fax:     [phone]      Physical Therapy Treatment Note/ Progress Report:       Date:  3/4/2020      Patient Name:  Raphael Wilde    :  1960  MRN: [id number]  Restrictions/Precautions:    Medical/Treatment Diagnosis Information:  Diagnosis: M17.11 (ICD-10-CM) - Primary osteoarthritis of right knee  Treatment Diagnosis: S/p R TKR DOS 2/10/20 , R knee movement dysfunction and pain   Insurance/Certification information:  PT Insurance Information: Medicare, $0 CP,  Med Nec, No Auth Req  Physician Information:  Referring Practitioner: Tawanna Pringle MD  Has the plan of care been signed (Y/N):        [x]  Yes  []  No     Date of Patient follow up with Physician: 20      Is this a Progress Report:     []  Yes  [x]  No        If Yes:  Date Range for reporting period:  Beginning 20  Ending 3/19/20    Progress report will be due (10 Rx or 30 days whichever is less):       Recertification will be due (POC Duration  / 90 days whichever is less): 20      Visit # Insurance Allowable Auth Required   4 Med Nec []  Yes [x]  No        Functional Scale: 94%   Date assessed:  20      Latex Allergy:  [x]NO      []YES  Preferred Language for Healthcare:   [x]English       []other:      Pain level:  5/10     SUBJECTIVE:  Pt states his knee is doing well.        OBJECTIVE:    Observation:    Test measurements: R knee PROM -3-112 deg    RESTRICTIONS/PRECAUTIONS: HTN, Diabetes (decreased sensation in R heel and distal leg), previous ulcer on R calf with decreased skin intergrity    Exercises/Interventions:   Therapeutic Ex (19616) Sets/sec Reps Notes/CUES HEP   Quad set with towel  2 10  x   Heel slides w/ strap 3 10  x   Hamstring stretch with towel under ankle 10\" 10  x   SLR  3 10      2 10 LTB x   Heel raises 3 10  x   Mini squat from chair 3 10 No UE assist x   LAQ 3 10     Bike  7'  Pt required max cues to decrease speed and decrease lateral WS with little carryover. Pt is able to complete full revolution. gastroc stretch  30\" 3     Standing hip ABD 3 10            Heel raises 3 10     Single leg stance  5\" 10     Step ups  3 10  6\"     Leg press 3 10 See pta note    Leg extension 3 10 See pta note    Leg curls 3 10 See pta note                                Manual Intervention (51357)                                                 NMR re-education (21385)   CUES NEEDED           SL balance 5 sec 5  Pt demonstrated stepping strategy and decreased tolerance. Tandem stance 10-25 sec 5       Gait training with SC 6'  Pt required verbal and demonstrative cues to perform proper gait mechanics with cane in L hand. Pt had fair carryover initially but this improved somewhat with more practice. Therapeutic Activity (94955)                                               Gameready 10mins    Therapeutic Exercise and NMR EXR  [x] (43509) Provided verbal/tactile cueing for activities related to strengthening, flexibility, endurance, ROM for improvements in LE, proximal hip, and core control with self care, mobility, lifting, ambulation. [x] (20912) Provided verbal/tactile cueing for activities related to improving balance, coordination, kinesthetic sense, posture, motor skill, proprioception to assist with LE, proximal hip, and core control in self-care, mobility, lifting, ambulation and eccentric single leg control.      NMR and Therapeutic Activities:    [x] (28981 or 11116) Provided verbal/tactile cueing for activities related to improving balance, coordination, kinesthetic sense, posture, motor skill, proprioception and motor activation to allow for proper function of core, proximal hip and LE with self-care and ADLs and functional mobility.   [] (27389) Gait Re-education- Provided training and instruction to the achieved in: 2 weeks  1. Independent in HEP and progression per patient tolerance, in order to prevent re-injury. [x] Progressing: [] Met: [] Not Met: [] Adjusted     2. Patient will have a decrease in pain to facilitate improvement in movement, function, and ADLs as indicated by Functional Deficits. [x] Progressing: [] Met: [] Not Met: [] Adjusted     Long Term Goals: To be achieved in: 12 weeks  1. Disability index score of 40% or less for the LEFS to assist with reaching prior level of function. [x] Progressing: [] Met: [] Not Met: [] Adjusted     2. Patient will demonstrate increased AROM to at least equal to the L knee ROM to allow for proper joint functioning as indicated by patients Functional Deficits. [x] Progressing: [] Met: [] Not Met: [] Adjusted     3. Patient will demonstrate an increase in Strength to good proximal hip strength and control, within 5lb HHD in LE to allow for proper functional mobility as indicated by patients Functional Deficits. [x] Progressing: [] Met: [] Not Met: [] Adjusted     4. Patient will return to all functional activities without increased symptoms or restriction. [x] Progressing: [] Met: [] Not Met: [] Adjusted     5. Pt will be able to ambulate without an AD with 0/10 pain and good heel to toe gait mechanics. [x] Progressing: [] Met: [] Not Met: [] Adjusted        Overall Progression Towards Functional goals/ Treatment Progress Update:  [x] Patient is progressing as expected towards functional goals listed. [] Progression is slowed due to complexities/Impairments listed. [] Progression has been slowed due to co-morbidities.   [] Plan just implemented, too soon to assess goals progression <30days   [] Goals require adjustment due to lack of progress  [] Patient is not progressing as expected and requires additional follow up with physician  [] Other    Prognosis for POC: [x] Good [] Fair  [] Poor      Patient requires continued skilled intervention: [x] Yes  [] No    Treatment/Activity Tolerance:  [x] Patient able to complete treatment  [] Patient limited by fatigue  [] Patient limited by pain    [] Patient limited by other medical complications  [] Other:       PLAN: See eval  [x] Continue per plan of care [] Alter current plan (see comments above)  [] Plan of care initiated [] Hold pending MD visit [] Discharge    Electronically signed by:  Bernard Abreu PT Dora Marsh, SPT    Note: If patient does not return for scheduled/ recommended follow up visits, this note will serve as a discharge from care along with most recent update on progress.

## 2020-03-06 ENCOUNTER — HOSPITAL ENCOUNTER (OUTPATIENT)
Dept: PHYSICAL THERAPY | Age: 60
Setting detail: THERAPIES SERIES
Discharge: HOME | End: 2020-03-06
Payer: COMMERCIAL

## 2020-03-06 PROCEDURE — 97110 THERAPEUTIC EXERCISES: CPT

## 2020-03-06 PROCEDURE — 97112 NEUROMUSCULAR REEDUCATION: CPT

## 2020-03-06 PROCEDURE — 97016 VASOPNEUMATIC DEVICE THERAPY: CPT

## 2020-03-06 NOTE — FLOWSHEET NOTE
723 [facility], [address] [place] [address], [address] [place] [address] [address]  Phone: [phone]   Fax:     [phone]      Physical Therapy Treatment Note/ Progress Report:       Date:  3/6/2020      Patient Name:  Brennen Guzman    :  1960  MRN: [id number]  Restrictions/Precautions:    Medical/Treatment Diagnosis Information:  Diagnosis: M17.11 (ICD-10-CM) - Primary osteoarthritis of right knee  Treatment Diagnosis: S/p R TKR DOS 2/10/20 , R knee movement dysfunction and pain   Insurance/Certification information:  PT Insurance Information: Medicare, $0 CP,  Med Nec, No Auth Req  Physician Information:  Referring Practitioner: Lucas Parrish MD  Has the plan of care been signed (Y/N):        [x]  Yes  []  No     Date of Patient follow up with Physician: 20      Is this a Progress Report:     []  Yes  [x]  No        If Yes:  Date Range for reporting period:  Beginning 20  Ending 3/19/20    Progress report will be due (10 Rx or 30 days whichever is less):       Recertification will be due (POC Duration  / 90 days whichever is less): 20      Visit # Insurance Allowable Auth Required   5 Med Nec []  Yes [x]  No        Functional Scale: 94%   Date assessed:  20      Latex Allergy:  [x]NO      []YES  Preferred Language for Healthcare:   [x]English       []other:      Pain level:  2/10     SUBJECTIVE:  Pt states knee is feeling better.      OBJECTIVE:    Observation:    Test measurements: R knee PROM -3-112 deg    RESTRICTIONS/PRECAUTIONS: HTN, Diabetes (decreased sensation in R heel and distal leg), previous ulcer on R calf with decreased skin intergrity    Exercises/Interventions:   Therapeutic Ex (04619) Sets/sec Reps Notes/CUES HEP   Quad set with towel  2 10  x   Heel slides w/ strap 3 10  x   Hamstring stretch with towel under ankle 10\" 10  x   SLR  3 10      2 10 LTB x   Heel raises 3 10  x   Mini squat from chair 3 10 No UE assist x   LAQ 3 10     Bike  10'  Pt required max cues to decrease speed and decrease lateral WS with little carryover. Pt is able to complete full revolution. gastroc stretch  30\" 3     Standing hip ABD 3 10            Heel raises 3 10     Single leg stance  5\" 10     Step ups  3 10  6\"     Leg press 3 10 See pta note    Leg extension 3 10 See pta note    Leg curls 3 10 See pta note                                Manual Intervention (05331)                                                 NMR re-education (97901)   CUES NEEDED           SL balance 5 sec 5  Pt demonstrated stepping strategy and decreased tolerance. Tandem stance 10-25 sec 5       Gait training with SC 6'  Pt required verbal and demonstrative cues to perform proper gait mechanics with cane in L hand. Pt had fair carryover initially but this improved somewhat with more practice. Therapeutic Activity (08168)                                               Gameready 10mins    Therapeutic Exercise and NMR EXR  [x] (20281) Provided verbal/tactile cueing for activities related to strengthening, flexibility, endurance, ROM for improvements in LE, proximal hip, and core control with self care, mobility, lifting, ambulation. [x] (38012) Provided verbal/tactile cueing for activities related to improving balance, coordination, kinesthetic sense, posture, motor skill, proprioception to assist with LE, proximal hip, and core control in self-care, mobility, lifting, ambulation and eccentric single leg control.      NMR and Therapeutic Activities:    [x] (31019 or 19637) Provided verbal/tactile cueing for activities related to improving balance, coordination, kinesthetic sense, posture, motor skill, proprioception and motor activation to allow for proper function of core, proximal hip and LE with self-care and ADLs and functional mobility.   [] (84852) Gait Re-education- Provided training and instruction to the order to prevent re-injury. [x] Progressing: [] Met: [] Not Met: [] Adjusted     2. Patient will have a decrease in pain to facilitate improvement in movement, function, and ADLs as indicated by Functional Deficits. [x] Progressing: [] Met: [] Not Met: [] Adjusted     Long Term Goals: To be achieved in: 12 weeks  1. Disability index score of 40% or less for the LEFS to assist with reaching prior level of function. [x] Progressing: [] Met: [] Not Met: [] Adjusted     2. Patient will demonstrate increased AROM to at least equal to the L knee ROM to allow for proper joint functioning as indicated by patients Functional Deficits. [x] Progressing: [] Met: [] Not Met: [] Adjusted     3. Patient will demonstrate an increase in Strength to good proximal hip strength and control, within 5lb HHD in LE to allow for proper functional mobility as indicated by patients Functional Deficits. [x] Progressing: [] Met: [] Not Met: [] Adjusted     4. Patient will return to all functional activities without increased symptoms or restriction. [x] Progressing: [] Met: [] Not Met: [] Adjusted     5. Pt will be able to ambulate without an AD with 0/10 pain and good heel to toe gait mechanics. [x] Progressing: [] Met: [] Not Met: [] Adjusted        Overall Progression Towards Functional goals/ Treatment Progress Update:  [x] Patient is progressing as expected towards functional goals listed. [] Progression is slowed due to complexities/Impairments listed. [] Progression has been slowed due to co-morbidities.   [] Plan just implemented, too soon to assess goals progression <30days   [] Goals require adjustment due to lack of progress  [] Patient is not progressing as expected and requires additional follow up with physician  [] Other    Prognosis for POC: [x] Good [] Fair  [] Poor      Patient requires continued skilled intervention: [x] Yes  [] No    Treatment/Activity Tolerance:  [x] Patient able to complete treatment  []

## 2020-03-06 NOTE — FLOWSHEET NOTE
Physical Therapist Assistant Activity Sheet    Date:  3/6/2020    Patient Name:  Ye Puckett    :  1960  MRN: [id number]  Restrictions/Precautions:    Medical/Treatment Diagnosis Information:  ·   Diagnosis: M17.11 (ICD-10-CM) - Primary osteoarthritis of right knee  ·  Treatment Diagnosis: S/p R TKR DOS 2/10/20 , R knee movement dysfunction and pain   Physician Information:    Referring Practitioner: Yelena Slaughter MD    Weeks Post-op  8 wks  12 wks 16 wks 20 wks   24 wks                            Activities                                                DOS/DOI:                                                    Date:  2/26/20 3/6/20   Bike     Elliptical     Treadmill     Airdyne          Gastroc stretch, heel raises 30\" x 3, 3 x 10 30\" x 3, 3 x 10   Soleus stretch     Hamstring stretch     ITB stretch     Hip Flexor stretch     Quad stretch     Adductor stretch          Weight Shifting sp                               fp                               tp     Lateral walking (with/w/o TB)          Balance: PEP/Olinda board                    SLS 5\" x 15 10\" x 15         Star excursion load/explode           Extremity reach UE/LE          Leg Press Alfonso. 60# x 30                     Ecc.                       Inv.  40# x 30   Calf Press Alfonso. Ecc.                       Inv.          REAL   Flex                ABd 0# x 30 R/L 35# x 30 R/L              ADd                TKE  50# x 30 c 3\" hold              Ext 0# x 30 R/L 35# x 30 R/L        Steps  Up 4\" x 30 6\" x 30               Up and Over                 Down                 Lateral                 Rotation          Squats:  Mini                    Wall                    BOSU          Lunges:  Lunge to Balance                    Balance to Lunge                    Walking          Knee Extension Bilat. 10# x 30                              Ecc.                                Inv. Hamstring Curls Bilat.   30# x 30 Ecc.                                 Inv.          Soleus Press Bilat. Ecc.                             Inv. Ladders     Square     Jump/Hop  Low                        Med.                        High               Modality Vaso 10 minutes Vaso 10 minutes   PTA Assessment Slow moving today. Fatigued with standing hip ex. Cues for sequencing with step ups. Difficulty understanding TKE ex with max verbal cues. No other difficulty performing ex.  Fatigued at conclusion   Time Based Treatment 20 minutes 30 minutes     Electronically signed by: Pairsa Cat PTA

## 2020-03-10 ENCOUNTER — HOSPITAL ENCOUNTER (OUTPATIENT)
Dept: PHYSICAL THERAPY | Age: 60
Setting detail: THERAPIES SERIES
Discharge: HOME | End: 2020-03-10
Payer: COMMERCIAL

## 2020-03-10 PROCEDURE — 97016 VASOPNEUMATIC DEVICE THERAPY: CPT

## 2020-03-10 PROCEDURE — 97112 NEUROMUSCULAR REEDUCATION: CPT

## 2020-03-10 PROCEDURE — 97110 THERAPEUTIC EXERCISES: CPT

## 2020-03-10 NOTE — FLOWSHEET NOTE
Physical Therapist Assistant Activity Sheet    Date:  3/10/2020    Patient Name:  Fransisco Godoy    :  1960  MRN: [id number]  Restrictions/Precautions:    Medical/Treatment Diagnosis Information:  ·   Diagnosis: M17.11 (ICD-10-CM) - Primary osteoarthritis of right knee  ·  Treatment Diagnosis: S/p R TKR DOS 2/10/20 , R knee movement dysfunction and pain   Physician Information:    Referring Practitioner: Vesta Del Valle MD    Weeks Post-op  8 wks  12 wks 16 wks 20 wks   24 wks                            Activities                                                DOS/DOI:                                                    Date:  2/26/20 3/6/20 3/10/20   Bike      Elliptical      Treadmill      Airdyne            Gastroc stretch, heel raises 30\" x 3, 3 x 10 30\" x 3, 3 x 10 30\" x 3, 3 x 10   Soleus stretch      Hamstring stretch      ITB stretch      Hip Flexor stretch      Quad stretch      Adductor stretch            Weight Shifting sp                                fp                                tp      Lateral walking (with/w/o TB)            Balance: PEP/Olinda board                     SLS 5\" x 15 10\" x 15 10\" X 15         Star excursion load/explode            Extremity reach UE/LE            Leg Press Alfonso. 60# x 30 #60 X30                      Ecc.                        Inv.  40# x 30 #40 X 30   Calf Press Alfonso.                         Ecc.                        Inv.            ERAL   Flex                 ABd 0# x 30 R/L 35# x 30 R/L #35 X30 R/L              ADd                 TKE  50# x 30 c 3\" hold #50 X 30 c 3\" hold              Ext 0# x 30 R/L 35# x 30 R/L #35 X30 R/L         Steps  Up 4\" x 30 6\" x 30 6\" X30                Up and Over                  Down                  Lateral                  Rotation            Squats:  Mini                     Wall                     BOSU            Lunges:  Lunge to Balance                     Balance to Lunge                     Walking            Knee

## 2020-03-10 NOTE — FLOWSHEET NOTE
[facility]23 [facility], [address] [place] [address], [address] [place] [address] [address]  Phone: [phone]   Fax:     [phone]      Physical Therapy Treatment Note/ Progress Report:       Date:  3/10/2020      Patient Name:  La Moore    :  1960  MRN: [id number]  Restrictions/Precautions:    Medical/Treatment Diagnosis Information:  Diagnosis: M17.11 (ICD-10-CM) - Primary osteoarthritis of right knee  Treatment Diagnosis: S/p R TKR DOS 2/10/20 , R knee movement dysfunction and pain   Insurance/Certification information:  PT Insurance Information: Medicare, $0 CP,  Med Nec, No Auth Req  Physician Information:  Referring Practitioner: Perla Ashby MD  Has the plan of care been signed (Y/N):        [x]  Yes  []  No     Date of Patient follow up with Physician: 20      Is this a Progress Report:     []  Yes  [x]  No        If Yes:  Date Range for reporting period:  Beginning 20  Ending 3/19/20    Progress report will be due (10 Rx or 30 days whichever is less): 3/52/31      Recertification will be due (POC Duration  / 90 days whichever is less): 20      Visit # Insurance Allowable Auth Required   6 Med Nec []  Yes [x]  No        Functional Scale: 94%   Date assessed:  20      Latex Allergy:  [x]NO      []YES  Preferred Language for Healthcare:   [x]English       []other:      Pain level:  2/10     SUBJECTIVE:  Pt reports his knee is doing fine.        OBJECTIVE:    Observation:    Test measurements: R knee PROM -1-115 deg     RESTRICTIONS/PRECAUTIONS: HTN, Diabetes (decreased sensation in R heel and distal leg), previous ulcer on R calf with decreased skin intergrity    Exercises/Interventions:   Therapeutic Ex (34440) Sets/sec Reps Notes/CUES HEP   Quad set with towel  2 10  x   Heel slides w/ strap 3 10  x   Hamstring stretch with towel under ankle 10\" 10  x   SLR  3 10      2 10 LTB x   Heel raises 3 10  x   Mini squat from chair 3 10 No UE assist x   LAQ 3 10     Bike  10'      gastroc stretch  30\" 3     Standing hip ABD 3 10            Heel raises 3 10     Single leg stance   5\" 10     Step ups  3 10  6\"     Leg press 3 10 See pta note    Leg extension 3 10 See pta note    Leg curls 3 10 See pta note    [facility]   ABD 3 10 See pta note                         Manual Intervention (36293)                                                 NMR re-education (61441)   CUES NEEDED           SL balance 5 sec 5  Pt demonstrated stepping strategy and decreased tolerance. Tandem stance 10-25 sec 5       Gait training with SC 6'  Pt required verbal and demonstrative cues to perform proper gait mechanics with cane in L hand. Pt had fair carryover initially but this improved somewhat with more practice. Therapeutic Activity (39028)                                               Gameready 10mins    Therapeutic Exercise and NMR EXR  [x] (15966) Provided verbal/tactile cueing for activities related to strengthening, flexibility, endurance, ROM for improvements in LE, proximal hip, and core control with self care, mobility, lifting, ambulation. [x] (41607) Provided verbal/tactile cueing for activities related to improving balance, coordination, kinesthetic sense, posture, motor skill, proprioception to assist with LE, proximal hip, and core control in self-care, mobility, lifting, ambulation and eccentric single leg control.      NMR and Therapeutic Activities:    [x] (12542 or 06602) Provided verbal/tactile cueing for activities related to improving balance, coordination, kinesthetic sense, posture, motor skill, proprioception and motor activation to allow for proper function of core, proximal hip and LE with self-care and ADLs and functional mobility.   [] (11298) Gait Re-education- Provided training and instruction to the patient for proper LE, core and proximal hip recruitment and positioning and eccentric body weight control with ambulation re-education including up and down stairs     Home Exercise Program:    [x] (06703) Reviewed/Progressed HEP activities related to strengthening, flexibility, endurance, ROM of core, proximal hip and LE for functional self-care, mobility, lifting and ambulation/stair navigation   [] (78212) Reviewed/Progressed HEP activities related to improving balance, coordination, kinesthetic sense, posture, motor skill, proprioception of core, proximal hip and LE for self-care, mobility, lifting, and ambulation/stair navigation      Manual Treatments:  PROM / STM / Oscillations-Mobs:  G-I, II, III, IV (PA's, Inf., Post.)  [x] (37903) Provided manual therapy to mobilize LE, proximal hip and/or LS spine soft tissue/joints for the purpose of modulating pain, promoting relaxation, increasing ROM, reducing/eliminating soft tissue swelling/inflammation/restriction, improving soft tissue extensibility and allowing for proper ROM for normal function with self-care, mobility, lifting and ambulation. Modalities:     [x] GAME READY (VASO)- for significant edema, swelling, pain control. Charges:  Timed Code Treatment Minutes: 40   Total Treatment Minutes: 50      [] EVAL (LOW) 99060 (typically 20 minutes face-to-face)  [] EVAL (MOD) 17840 (typically 30 minutes face-to-face)  [] EVAL (HIGH) 63765 (typically 45 minutes face-to-face)  [] RE-EVAL     [x] PF(27040) x 2    [] IONTO  [x] NMR (34339) x  1   [x] VASO x1  [] Manual (25721) x     [] Other:  [] TA x      [] Mech Traction (97012)  [] ES(attended) (49687)      [] ES (un) (20337):    ASSESSMENT:  Pt wilmar session well. Se PTA note    GOALS:     Patient stated goal: walk without pain and get back to normal work duties. Therapist goals for Patient:   Short Term Goals: To be achieved in: 2 weeks  1. Independent in HEP and progression per patient tolerance, in order to prevent re-injury. [x] Progressing: [] Met: [] Not Met: [] Adjusted     2.  Patient will complications  [] Other:       PLAN: See eval  [x] Continue per plan of care [] Alter current plan (see comments above)  [] Plan of care initiated [] Hold pending MD visit [] Discharge    Electronically signed by:  Juan Ray PT     Note: If patient does not return for scheduled/ recommended follow up visits, this note will serve as a discharge from care along with most recent update on progress.

## 2020-03-13 ENCOUNTER — HOSPITAL ENCOUNTER (OUTPATIENT)
Dept: PHYSICAL THERAPY | Age: 60
Setting detail: THERAPIES SERIES
Discharge: HOME | End: 2020-03-13
Payer: COMMERCIAL

## 2020-03-13 PROCEDURE — 97110 THERAPEUTIC EXERCISES: CPT

## 2020-03-13 PROCEDURE — 97112 NEUROMUSCULAR REEDUCATION: CPT

## 2020-03-13 PROCEDURE — 97016 VASOPNEUMATIC DEVICE THERAPY: CPT

## 2020-03-13 NOTE — FLOWSHEET NOTE
[facility], [address] [place] [address], [address] [place] [address] [address]  Phone: [phone]   Fax:     [phone]      Physical Therapy Treatment Note/ Progress Report:       Date:  3/13/2020      Patient Name:  Ye Puckett    :  1960  MRN: [id number]  Restrictions/Precautions:    Medical/Treatment Diagnosis Information:  Diagnosis: M17.11 (ICD-10-CM) - Primary osteoarthritis of right knee  Treatment Diagnosis: S/p R TKR DOS 2/10/20 , R knee movement dysfunction and pain   Insurance/Certification information:  PT Insurance Information: Medicare, $0 CP,  Med Nec, No Auth Req  Physician Information:  Referring Practitioner: Yelena Slaughter MD  Has the plan of care been signed (Y/N):        [x]  Yes  []  No     Date of Patient follow up with Physician: 20      Is this a Progress Report:     []  Yes  [x]  No        If Yes:  Date Range for reporting period:  Beginning 20  Ending 3/19/20    Progress report will be due (10 Rx or 30 days whichever is less):       Recertification will be due (POC Duration  / 90 days whichever is less): 20      Visit # Insurance Allowable Auth Required   7 Med Nec []  Yes [x]  No        Functional Scale: 94%   Date assessed:  20      Latex Allergy:  [x]NO      []YES  Preferred Language for Healthcare:   [x]English       []other:      Pain level:  2/10     SUBJECTIVE:  Pt reports his knee is improving.           OBJECTIVE:    Observation:    Test measurements: R knee PROM 0-115 deg     RESTRICTIONS/PRECAUTIONS: HTN, Diabetes (decreased sensation in R heel and distal leg), previous ulcer on R calf with decreased skin intergrity    Exercises/Interventions:   Therapeutic Ex (69654) Sets/sec Reps Notes/CUES HEP   Quad set with towel  2 10  x   Heel slides w/ strap 3 10  x   Heel prop 5'  3# x   SLR  3 10      2 10 LTB x   Heel raises 3 10  x   Mini squat from chair 3 10 No UE assist x   LAQ 3 10 movement, function, and ADLs as indicated by Functional Deficits. [x] Progressing: [] Met: [] Not Met: [] Adjusted     Long Term Goals: To be achieved in: 12 weeks  1. Disability index score of 40% or less for the LEFS to assist with reaching prior level of function. [x] Progressing: [] Met: [] Not Met: [] Adjusted     2. Patient will demonstrate increased AROM to at least equal to the L knee ROM to allow for proper joint functioning as indicated by patients Functional Deficits. [x] Progressing: [] Met: [] Not Met: [] Adjusted     3. Patient will demonstrate an increase in Strength to good proximal hip strength and control, within 5lb HHD in LE to allow for proper functional mobility as indicated by patients Functional Deficits. [x] Progressing: [] Met: [] Not Met: [] Adjusted     4. Patient will return to all functional activities without increased symptoms or restriction. [x] Progressing: [] Met: [] Not Met: [] Adjusted     5. Pt will be able to ambulate without an AD with 0/10 pain and good heel to toe gait mechanics. [x] Progressing: [] Met: [] Not Met: [] Adjusted        Overall Progression Towards Functional goals/ Treatment Progress Update:  [x] Patient is progressing as expected towards functional goals listed. [] Progression is slowed due to complexities/Impairments listed. [] Progression has been slowed due to co-morbidities.   [] Plan just implemented, too soon to assess goals progression <30days   [] Goals require adjustment due to lack of progress  [] Patient is not progressing as expected and requires additional follow up with physician  [] Other    Prognosis for POC: [x] Good [] Fair  [] Poor      Patient requires continued skilled intervention: [x] Yes  [] No    Treatment/Activity Tolerance:  [x] Patient able to complete treatment  [] Patient limited by fatigue  [] Patient limited by pain    [] Patient limited by other medical complications  [] Other:       PLAN: See eval  [x] Continue per plan of care [] Alter current plan (see comments above)  [] Plan of care initiated [] Hold pending MD visit [] Discharge    Electronically signed by:  Tello Vo PT     Note: If patient does not return for scheduled/ recommended follow up visits, this note will serve as a discharge from care along with most recent update on progress.

## 2020-03-17 ENCOUNTER — TELEPHONE (OUTPATIENT)
Dept: ORTHOPEDIC SURGERY | Age: 60
End: 2020-03-17

## 2020-03-24 ENCOUNTER — HOSPITAL ENCOUNTER (OUTPATIENT)
Dept: PHYSICAL THERAPY | Age: 60
Setting detail: THERAPIES SERIES
Discharge: HOME | End: 2020-03-24
Payer: COMMERCIAL

## 2020-03-24 NOTE — FLOWSHEET NOTE
016 [facility], [address] [place] [address], [address] [place] [address] [address]  Phone: [phone]   Fax:     [phone]    Physical Therapy  Cancellation/No-show Note  Patient Name:  Steven Sheppard  :  1960   Date:  3/24/2020    Cancelled visits to date: 1  No-shows to date: 0    For today's appointment patient:  [x]  Cancelled  []  Rescheduled appointment  []  No-show     Reason given by patient:  []  Patient ill  []  Conflicting appointment  []  No transportation    []  Conflict with work  []  No reason given  []  Other:     Comments:  covid    Phone call information:   []  Phone call made today to patient at _ time at number provided:      []  Patient answered, conversation as follows:    []  Patient did not answer, message left as follows:  []  Phone call not made today  []  Phone call not needed - pt contacted us to cancel and provided reason for cancellation.      Electronically signed by:  Femi Renteria PT

## 2020-03-31 ENCOUNTER — HOSPITAL ENCOUNTER (OUTPATIENT)
Dept: PHYSICAL THERAPY | Age: 60
Setting detail: THERAPIES SERIES
Discharge: HOME | End: 2020-03-31
Payer: COMMERCIAL

## 2020-03-31 NOTE — FLOWSHEET NOTE
[address]23 [facility], [address] [place] [address], [address] [place] [address] [address]  Phone: [phone]   Fax:     [phone]    Physical Therapy  Cancellation/No-show Note  Patient Name:  Antonietta Alexander  :  1960   Date:  3/31/2020    Cancelled visits to date: 1  No-shows to date: 1    For today's appointment patient:  []  Cancelled  []  Rescheduled appointment  [x]  No-show     Reason given by patient:  []  Patient ill  []  Conflicting appointment  []  No transportation    []  Conflict with work  [x]  No reason given  []  Other:     Comments:  covid    Phone call information:   []  Phone call made today to patient at _ time at number provided:      []  Patient answered, conversation as follows:    []  Patient did not answer, message left as follows:  [x]  Phone call not made today  []  Phone call not needed - pt contacted us to cancel and provided reason for cancellation.      Electronically signed by:  Anton De La O PT

## 2021-08-17 ENCOUNTER — HOSPITAL ENCOUNTER (EMERGENCY)
Age: 61
Discharge: HOME | End: 2021-08-17
Attending: EMERGENCY MEDICINE
Payer: COMMERCIAL

## 2021-08-17 ENCOUNTER — APPOINTMENT (OUTPATIENT)
Dept: GENERAL RADIOLOGY | Age: 61
End: 2021-08-17
Payer: COMMERCIAL

## 2021-08-17 VITALS
TEMPERATURE: 97.8 F | HEART RATE: 81 BPM | SYSTOLIC BLOOD PRESSURE: 123 MMHG | OXYGEN SATURATION: 96 % | DIASTOLIC BLOOD PRESSURE: 50 MMHG | RESPIRATION RATE: 21 BRPM

## 2021-08-17 DIAGNOSIS — J40 COMPLICATED BRONCHITIS: Primary | ICD-10-CM

## 2021-08-17 DIAGNOSIS — D64.9 CHRONIC ANEMIA: ICD-10-CM

## 2021-08-17 DIAGNOSIS — N17.9 AKI (ACUTE KIDNEY INJURY) (HCC): ICD-10-CM

## 2021-08-17 LAB
ANION GAP SERPL CALCULATED.3IONS-SCNC: 12 MMOL/L (ref 3–16)
BASOPHILS ABSOLUTE: 0.1 K/UL (ref 0–0.2)
BASOPHILS RELATIVE PERCENT: 1.1 %
BUN BLDV-MCNC: 21 MG/DL (ref 7–20)
CALCIUM SERPL-MCNC: 8.7 MG/DL (ref 8.3–10.6)
CHLORIDE BLD-SCNC: 104 MMOL/L (ref 99–110)
CO2: 22 MMOL/L (ref 21–32)
CREAT SERPL-MCNC: 1.3 MG/DL (ref 0.8–1.3)
EOSINOPHILS ABSOLUTE: 0 K/UL (ref 0–0.6)
EOSINOPHILS RELATIVE PERCENT: 0.6 %
GFR AFRICAN AMERICAN: >60
GFR NON-AFRICAN AMERICAN: 56
GLUCOSE BLD-MCNC: 178 MG/DL (ref 70–99)
HCT VFR BLD CALC: 36.8 % (ref 40.5–52.5)
HEMOGLOBIN: 12.4 G/DL (ref 13.5–17.5)
LYMPHOCYTES ABSOLUTE: 0.4 K/UL (ref 1–5.1)
LYMPHOCYTES RELATIVE PERCENT: 5.1 %
MCH RBC QN AUTO: 32.8 PG (ref 26–34)
MCHC RBC AUTO-ENTMCNC: 33.6 G/DL (ref 31–36)
MCV RBC AUTO: 97.5 FL (ref 80–100)
MONOCYTES ABSOLUTE: 0.5 K/UL (ref 0–1.3)
MONOCYTES RELATIVE PERCENT: 6.8 %
NEUTROPHILS ABSOLUTE: 6.6 K/UL (ref 1.7–7.7)
NEUTROPHILS RELATIVE PERCENT: 86.4 %
PDW BLD-RTO: 13.2 % (ref 12.4–15.4)
PLATELET # BLD: 139 K/UL (ref 135–450)
PMV BLD AUTO: 8.2 FL (ref 5–10.5)
POTASSIUM SERPL-SCNC: 4.2 MMOL/L (ref 3.5–5.1)
RBC # BLD: 3.78 M/UL (ref 4.2–5.9)
SODIUM BLD-SCNC: 138 MMOL/L (ref 136–145)
TROPONIN: <0.01 NG/ML
WBC # BLD: 7.6 K/UL (ref 4–11)

## 2021-08-17 PROCEDURE — 93005 ELECTROCARDIOGRAM TRACING: CPT | Performed by: EMERGENCY MEDICINE

## 2021-08-17 PROCEDURE — 71046 X-RAY EXAM CHEST 2 VIEWS: CPT

## 2021-08-17 PROCEDURE — 84484 ASSAY OF TROPONIN QUANT: CPT

## 2021-08-17 PROCEDURE — 6370000000 HC RX 637 (ALT 250 FOR IP): Performed by: EMERGENCY MEDICINE

## 2021-08-17 PROCEDURE — 6360000002 HC RX W HCPCS: Performed by: EMERGENCY MEDICINE

## 2021-08-17 PROCEDURE — 96374 THER/PROPH/DIAG INJ IV PUSH: CPT

## 2021-08-17 PROCEDURE — 85025 COMPLETE CBC W/AUTO DIFF WBC: CPT

## 2021-08-17 PROCEDURE — 80048 BASIC METABOLIC PNL TOTAL CA: CPT

## 2021-08-17 PROCEDURE — 99285 EMERGENCY DEPT VISIT HI MDM: CPT

## 2021-08-17 PROCEDURE — 2580000003 HC RX 258: Performed by: EMERGENCY MEDICINE

## 2021-08-17 RX ORDER — METHOCARBAMOL 500 MG/1
500 TABLET, FILM COATED ORAL 3 TIMES DAILY
Qty: 21 TABLET | Refills: 0 | Status: SHIPPED | OUTPATIENT
Start: 2021-08-17 | End: 2021-08-24

## 2021-08-17 RX ORDER — METHOCARBAMOL 500 MG/1
500 TABLET, FILM COATED ORAL ONCE
Status: COMPLETED | OUTPATIENT
Start: 2021-08-17 | End: 2021-08-17

## 2021-08-17 RX ORDER — IPRATROPIUM BROMIDE AND ALBUTEROL SULFATE 2.5; .5 MG/3ML; MG/3ML
1 SOLUTION RESPIRATORY (INHALATION) ONCE
Status: COMPLETED | OUTPATIENT
Start: 2021-08-17 | End: 2021-08-17

## 2021-08-17 RX ORDER — DOXYCYCLINE HYCLATE 100 MG
100 TABLET ORAL 2 TIMES DAILY
Qty: 20 TABLET | Refills: 0 | Status: SHIPPED | OUTPATIENT
Start: 2021-08-17 | End: 2021-08-27

## 2021-08-17 RX ORDER — PREDNISONE 10 MG/1
TABLET ORAL
Qty: 35 TABLET | Refills: 0 | Status: SHIPPED | OUTPATIENT
Start: 2021-08-17 | End: 2021-08-28

## 2021-08-17 RX ORDER — 0.9 % SODIUM CHLORIDE 0.9 %
1000 INTRAVENOUS SOLUTION INTRAVENOUS ONCE
Status: COMPLETED | OUTPATIENT
Start: 2021-08-17 | End: 2021-08-17

## 2021-08-17 RX ORDER — METHYLPREDNISOLONE SODIUM SUCCINATE 125 MG/2ML
125 INJECTION, POWDER, LYOPHILIZED, FOR SOLUTION INTRAMUSCULAR; INTRAVENOUS ONCE
Status: COMPLETED | OUTPATIENT
Start: 2021-08-17 | End: 2021-08-17

## 2021-08-17 RX ORDER — ALBUTEROL SULFATE 90 UG/1
2 AEROSOL, METERED RESPIRATORY (INHALATION) EVERY 4 HOURS PRN
Qty: 1 INHALER | Refills: 0 | Status: SHIPPED | OUTPATIENT
Start: 2021-08-17

## 2021-08-17 RX ADMIN — IPRATROPIUM BROMIDE AND ALBUTEROL SULFATE 1 AMPULE: .5; 3 SOLUTION RESPIRATORY (INHALATION) at 14:58

## 2021-08-17 RX ADMIN — SODIUM CHLORIDE 1000 ML: 9 INJECTION, SOLUTION INTRAVENOUS at 13:00

## 2021-08-17 RX ADMIN — METHYLPREDNISOLONE SODIUM SUCCINATE 125 MG: 125 INJECTION, POWDER, FOR SOLUTION INTRAMUSCULAR; INTRAVENOUS at 14:58

## 2021-08-17 RX ADMIN — METHOCARBAMOL TABLETS 500 MG: 500 TABLET, COATED ORAL at 14:58

## 2021-08-17 ASSESSMENT — PAIN SCALES - GENERAL: PAINLEVEL_OUTOF10: 7

## 2021-08-17 ASSESSMENT — PAIN DESCRIPTION - LOCATION: LOCATION: RIB CAGE

## 2021-08-17 ASSESSMENT — PAIN DESCRIPTION - ORIENTATION: ORIENTATION: LEFT

## 2021-08-17 ASSESSMENT — PAIN DESCRIPTION - PAIN TYPE: TYPE: ACUTE PAIN

## 2021-08-17 NOTE — ED NOTES
Patient being discharged home,discharge instructions printed and reviewed with patient, denies any questions. Patient ambulated off unit with no signs of distress.      Jelly Ybarra RN  08/17/21 6840

## 2021-08-17 NOTE — ED NOTES
Pt reports that he feels much better after receiving medications. Pt's sister is at bedside. Pt denies any needs at this time.       Maggie Chandler RN  08/17/21 1903

## 2021-08-17 NOTE — ED TRIAGE NOTES
Pt presents today with pain to L ribs. Pt had a fall 1 week ago, pt tripped over concrete curb and fell face first on concrete, hitting his chin. Sister is at bedside with pt. Pt also c/o coughing for the past week. Pt rates pain to L ribs 10/10. Pain intensifies with coughing.

## 2021-08-18 LAB
EKG ATRIAL RATE: 68 BPM
EKG DIAGNOSIS: NORMAL
EKG P AXIS: 52 DEGREES
EKG P-R INTERVAL: 124 MS
EKG Q-T INTERVAL: 412 MS
EKG QRS DURATION: 86 MS
EKG QTC CALCULATION (BAZETT): 438 MS
EKG R AXIS: 47 DEGREES
EKG T AXIS: 55 DEGREES
EKG VENTRICULAR RATE: 68 BPM

## 2021-08-18 PROCEDURE — 93010 ELECTROCARDIOGRAM REPORT: CPT | Performed by: INTERNAL MEDICINE
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
## 2024-01-17 ENCOUNTER — HOSPITAL ENCOUNTER (EMERGENCY)
Age: 64
Discharge: HOME | End: 2024-01-17
Attending: STUDENT IN AN ORGANIZED HEALTH CARE EDUCATION/TRAINING PROGRAM
Payer: COMMERCIAL

## 2024-01-17 ENCOUNTER — APPOINTMENT (OUTPATIENT)
Dept: GENERAL RADIOLOGY | Age: 64
End: 2024-01-17
Payer: COMMERCIAL

## 2024-01-17 VITALS
SYSTOLIC BLOOD PRESSURE: 131 MMHG | BODY MASS INDEX: 37.3 KG/M2 | RESPIRATION RATE: 21 BRPM | OXYGEN SATURATION: 96 % | TEMPERATURE: 98.1 F | HEART RATE: 54 BPM | WEIGHT: 190 LBS | HEIGHT: 60 IN | DIASTOLIC BLOOD PRESSURE: 66 MMHG

## 2024-01-17 DIAGNOSIS — K52.9 GASTROENTERITIS: Primary | ICD-10-CM

## 2024-01-17 LAB
ALBUMIN SERPL-MCNC: 4.3 G/DL (ref 3.4–5)
ALBUMIN/GLOB SERPL: 1.5 {RATIO} (ref 1.1–2.2)
ALP SERPL-CCNC: 83 U/L (ref 40–129)
ALT SERPL-CCNC: 9 U/L (ref 10–40)
ANION GAP SERPL CALCULATED.3IONS-SCNC: 12 MMOL/L (ref 3–16)
AST SERPL-CCNC: 17 U/L (ref 15–37)
BASE EXCESS BLDV CALC-SCNC: -2.6 MMOL/L (ref -3–3)
BASOPHILS # BLD: 0.1 K/UL (ref 0–0.2)
BASOPHILS NFR BLD: 1 %
BILIRUB SERPL-MCNC: 0.5 MG/DL (ref 0–1)
BUN SERPL-MCNC: 38 MG/DL (ref 7–20)
CALCIUM SERPL-MCNC: 9.5 MG/DL (ref 8.3–10.6)
CHLORIDE SERPL-SCNC: 105 MMOL/L (ref 99–110)
CO2 BLDV-SCNC: 25 MMOL/L
CO2 SERPL-SCNC: 22 MMOL/L (ref 21–32)
COHGB MFR BLDV: 3.3 % (ref 0–1.5)
CREAT SERPL-MCNC: 1.5 MG/DL (ref 0.8–1.3)
DEPRECATED RDW RBC AUTO: 13 % (ref 12.4–15.4)
EKG ATRIAL RATE: 57 BPM
EKG DIAGNOSIS: NORMAL
EKG P AXIS: 49 DEGREES
EKG P-R INTERVAL: 138 MS
EKG Q-T INTERVAL: 460 MS
EKG QRS DURATION: 96 MS
EKG QTC CALCULATION (BAZETT): 447 MS
EKG R AXIS: 56 DEGREES
EKG T AXIS: 62 DEGREES
EKG VENTRICULAR RATE: 57 BPM
EOSINOPHIL # BLD: 0.2 K/UL (ref 0–0.6)
EOSINOPHIL NFR BLD: 2.1 %
FLUAV RNA UPPER RESP QL NAA+PROBE: NEGATIVE
FLUBV AG NPH QL: NEGATIVE
GFR SERPLBLD CREATININE-BSD FMLA CKD-EPI: 51 ML/MIN/{1.73_M2}
GLUCOSE SERPL-MCNC: 256 MG/DL (ref 70–99)
HCO3 BLDV-SCNC: 23.2 MMOL/L (ref 23–29)
HCT VFR BLD AUTO: 38.6 % (ref 40.5–52.5)
HGB BLD-MCNC: 13 G/DL (ref 13.5–17.5)
INR PPP: 0.93 (ref 0.84–1.16)
LIPASE SERPL-CCNC: 35 U/L (ref 13–60)
LYMPHOCYTES # BLD: 1.3 K/UL (ref 1–5.1)
LYMPHOCYTES NFR BLD: 15.1 %
MCH RBC QN AUTO: 31.8 PG (ref 26–34)
MCHC RBC AUTO-ENTMCNC: 33.7 G/DL (ref 31–36)
MCV RBC AUTO: 94.5 FL (ref 80–100)
METHGB MFR BLDV: 0.3 %
MONOCYTES # BLD: 0.6 K/UL (ref 0–1.3)
MONOCYTES NFR BLD: 7 %
NEUTROPHILS # BLD: 6.5 K/UL (ref 1.7–7.7)
NEUTROPHILS NFR BLD: 74.8 %
O2 THERAPY: ABNORMAL
PCO2 BLDV: 43.5 MMHG (ref 40–50)
PH BLDV: 7.34 [PH] (ref 7.35–7.45)
PLATELET # BLD AUTO: 178 K/UL (ref 135–450)
PMV BLD AUTO: 8.6 FL (ref 5–10.5)
PO2 BLDV: 27.9 MMHG (ref 25–40)
POTASSIUM SERPL-SCNC: 4.8 MMOL/L (ref 3.5–5.1)
PROT SERPL-MCNC: 7.2 G/DL (ref 6.4–8.2)
PROTHROMBIN TIME: 12.4 SEC (ref 11.5–14.8)
RBC # BLD AUTO: 4.09 M/UL (ref 4.2–5.9)
SAO2 % BLDV: 49 %
SARS-COV-2 RDRP RESP QL NAA+PROBE: NOT DETECTED
SODIUM SERPL-SCNC: 139 MMOL/L (ref 136–145)
TROPONIN, HIGH SENSITIVITY: 24 NG/L (ref 0–22)
TROPONIN, HIGH SENSITIVITY: 24 NG/L (ref 0–22)
WBC # BLD AUTO: 8.7 K/UL (ref 4–11)

## 2024-01-17 PROCEDURE — 99285 EMERGENCY DEPT VISIT HI MDM: CPT

## 2024-01-17 PROCEDURE — 96361 HYDRATE IV INFUSION ADD-ON: CPT

## 2024-01-17 PROCEDURE — 85025 COMPLETE CBC W/AUTO DIFF WBC: CPT

## 2024-01-17 PROCEDURE — 36415 COLL VENOUS BLD VENIPUNCTURE: CPT

## 2024-01-17 PROCEDURE — 93010 ELECTROCARDIOGRAM REPORT: CPT | Performed by: INTERNAL MEDICINE

## 2024-01-17 PROCEDURE — 96374 THER/PROPH/DIAG INJ IV PUSH: CPT

## 2024-01-17 PROCEDURE — 84484 ASSAY OF TROPONIN QUANT: CPT

## 2024-01-17 PROCEDURE — 82803 BLOOD GASES ANY COMBINATION: CPT

## 2024-01-17 PROCEDURE — 83690 ASSAY OF LIPASE: CPT

## 2024-01-17 PROCEDURE — 6360000002 HC RX W HCPCS: Performed by: STUDENT IN AN ORGANIZED HEALTH CARE EDUCATION/TRAINING PROGRAM

## 2024-01-17 PROCEDURE — 87804 INFLUENZA ASSAY W/OPTIC: CPT

## 2024-01-17 PROCEDURE — 85610 PROTHROMBIN TIME: CPT

## 2024-01-17 PROCEDURE — 87635 SARS-COV-2 COVID-19 AMP PRB: CPT

## 2024-01-17 PROCEDURE — 2580000003 HC RX 258: Performed by: STUDENT IN AN ORGANIZED HEALTH CARE EDUCATION/TRAINING PROGRAM

## 2024-01-17 PROCEDURE — 80053 COMPREHEN METABOLIC PANEL: CPT

## 2024-01-17 PROCEDURE — 93005 ELECTROCARDIOGRAM TRACING: CPT | Performed by: STUDENT IN AN ORGANIZED HEALTH CARE EDUCATION/TRAINING PROGRAM

## 2024-01-17 PROCEDURE — 71045 X-RAY EXAM CHEST 1 VIEW: CPT

## 2024-01-17 RX ORDER — 0.9 % SODIUM CHLORIDE 0.9 %
1000 INTRAVENOUS SOLUTION INTRAVENOUS ONCE
Status: COMPLETED | OUTPATIENT
Start: 2024-01-17 | End: 2024-01-17

## 2024-01-17 RX ORDER — LOSARTAN POTASSIUM 100 MG/1
100 TABLET ORAL DAILY
COMMUNITY

## 2024-01-17 RX ORDER — ONDANSETRON 4 MG/1
4 TABLET, ORALLY DISINTEGRATING ORAL 3 TIMES DAILY PRN
Qty: 21 TABLET | Refills: 0 | Status: SHIPPED | OUTPATIENT
Start: 2024-01-17

## 2024-01-17 RX ORDER — ONDANSETRON 2 MG/ML
4 INJECTION INTRAMUSCULAR; INTRAVENOUS ONCE
Status: COMPLETED | OUTPATIENT
Start: 2024-01-17 | End: 2024-01-17

## 2024-01-17 RX ADMIN — SODIUM CHLORIDE 1000 ML: 9 INJECTION, SOLUTION INTRAVENOUS at 11:47

## 2024-01-17 RX ADMIN — ONDANSETRON 4 MG: 2 INJECTION INTRAMUSCULAR; INTRAVENOUS at 11:47

## 2024-01-17 ASSESSMENT — PAIN DESCRIPTION - DESCRIPTORS: DESCRIPTORS: SORE

## 2024-01-17 ASSESSMENT — PAIN DESCRIPTION - FREQUENCY: FREQUENCY: CONTINUOUS

## 2024-01-17 ASSESSMENT — PAIN DESCRIPTION - ORIENTATION: ORIENTATION: MID

## 2024-01-17 ASSESSMENT — PAIN SCALES - GENERAL: PAINLEVEL_OUTOF10: 5

## 2024-01-17 ASSESSMENT — LIFESTYLE VARIABLES
HOW MANY STANDARD DRINKS CONTAINING ALCOHOL DO YOU HAVE ON A TYPICAL DAY: PATIENT DOES NOT DRINK
HOW OFTEN DO YOU HAVE A DRINK CONTAINING ALCOHOL: NEVER

## 2024-01-17 ASSESSMENT — PAIN DESCRIPTION - PAIN TYPE: TYPE: ACUTE PAIN

## 2024-01-17 ASSESSMENT — PAIN DESCRIPTION - LOCATION: LOCATION: CHEST

## 2024-01-17 ASSESSMENT — PAIN - FUNCTIONAL ASSESSMENT: PAIN_FUNCTIONAL_ASSESSMENT: 0-10

## 2024-01-17 NOTE — DISCHARGE INSTRUCTIONS
Return to nearest ED if you are having nausea vomiting control with Zofran, severe worsening chest pain, other concerning symptoms.

## 2024-01-17 NOTE — ED NOTES
Discharge instructions and medications reviewed with patient. Patient verbalized understanding of medications and follow up. All questions answered at this time. IV removed, dressing applied, and bleeding controlled. Skin warm, pink, and dry. Patient alert and oriented x4. Pt ambulated to lobby with a stable gait. Patient discharged home with 1 prescriptions.
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
## 2025-06-18 ENCOUNTER — TRANSCRIBE ORDERS (OUTPATIENT)
Dept: ADMINISTRATIVE | Age: 65
End: 2025-06-18

## 2025-06-18 DIAGNOSIS — Z13.6 SCREENING FOR ABDOMINAL AORTIC ANEURYSM: Primary | ICD-10-CM

## 2025-06-21 ENCOUNTER — HOSPITAL ENCOUNTER (INPATIENT)
Age: 65
LOS: 1 days | Discharge: HOME | DRG: 641 | End: 2025-06-22
Attending: EMERGENCY MEDICINE | Admitting: STUDENT IN AN ORGANIZED HEALTH CARE EDUCATION/TRAINING PROGRAM
Payer: MEDICARE

## 2025-06-21 ENCOUNTER — APPOINTMENT (OUTPATIENT)
Dept: CT IMAGING | Age: 65
DRG: 641 | End: 2025-06-21
Payer: MEDICARE

## 2025-06-21 ENCOUNTER — APPOINTMENT (OUTPATIENT)
Dept: GENERAL RADIOLOGY | Age: 65
DRG: 641 | End: 2025-06-21
Payer: MEDICARE

## 2025-06-21 DIAGNOSIS — E83.42 HYPOMAGNESEMIA: ICD-10-CM

## 2025-06-21 DIAGNOSIS — E83.51 HYPOCALCEMIA: ICD-10-CM

## 2025-06-21 DIAGNOSIS — E87.6 HYPOKALEMIA: Primary | ICD-10-CM

## 2025-06-21 PROBLEM — E87.8 ELECTROLYTE IMBALANCE: Status: ACTIVE | Noted: 2025-06-21

## 2025-06-21 LAB
ABO/RH: NORMAL
ALBUMIN SERPL-MCNC: 2 G/DL (ref 3.4–5)
ALBUMIN/GLOB SERPL: 2 {RATIO} (ref 1.1–2.2)
ALP SERPL-CCNC: 34 U/L (ref 40–129)
ALT SERPL-CCNC: <5 U/L (ref 10–40)
ANION GAP SERPL CALCULATED.3IONS-SCNC: 11 MMOL/L (ref 3–16)
ANTIBODY SCREEN: NORMAL
AST SERPL-CCNC: 9 U/L (ref 15–37)
BASE EXCESS BLDV CALC-SCNC: -3.4 MMOL/L (ref -3–3)
BASOPHILS # BLD: 0.1 K/UL (ref 0–0.2)
BASOPHILS NFR BLD: 1.3 %
BILIRUB SERPL-MCNC: <0.2 MG/DL (ref 0–1)
BILIRUB UR QL STRIP.AUTO: ABNORMAL
BUN SERPL-MCNC: 14 MG/DL (ref 7–20)
CALCIUM SERPL-MCNC: 4.5 MG/DL (ref 8.3–10.6)
CHLORIDE SERPL-SCNC: 125 MMOL/L (ref 99–110)
CHP ED QC CHECK: YES
CLARITY UR: CLEAR
CO2 BLDV-SCNC: 25 MMOL/L
CO2 SERPL-SCNC: 12 MMOL/L (ref 21–32)
COHGB MFR BLDV: 1.6 % (ref 0–1.5)
COLOR UR: YELLOW
CREAT SERPL-MCNC: 0.5 MG/DL (ref 0.8–1.3)
DEPRECATED RDW RBC AUTO: 13.4 % (ref 12.4–15.4)
EOSINOPHIL # BLD: 0 K/UL (ref 0–0.6)
EOSINOPHIL NFR BLD: 0.4 %
EPI CELLS #/AREA URNS HPF: ABNORMAL /HPF (ref 0–5)
GFR SERPLBLD CREATININE-BSD FMLA CKD-EPI: >90 ML/MIN/{1.73_M2}
GLUCOSE BLD-MCNC: 143 MG/DL
GLUCOSE BLD-MCNC: 143 MG/DL (ref 70–99)
GLUCOSE SERPL-MCNC: 88 MG/DL (ref 70–99)
GLUCOSE UR STRIP.AUTO-MCNC: NEGATIVE MG/DL
HCO3 BLDV-SCNC: 23.4 MMOL/L (ref 23–29)
HCT VFR BLD AUTO: 38.2 % (ref 40.5–52.5)
HGB BLD-MCNC: 12.8 G/DL (ref 13.5–17.5)
HGB UR QL STRIP.AUTO: ABNORMAL
KETONES UR STRIP.AUTO-MCNC: 40 MG/DL
LACTATE BLDV-SCNC: 1.9 MMOL/L (ref 0.4–1.9)
LEUKOCYTE ESTERASE UR QL STRIP.AUTO: NEGATIVE
LYMPHOCYTES # BLD: 0.8 K/UL (ref 1–5.1)
LYMPHOCYTES NFR BLD: 8.9 %
MAGNESIUM SERPL-MCNC: 0.86 MG/DL (ref 1.8–2.4)
MCH RBC QN AUTO: 32.4 PG (ref 26–34)
MCHC RBC AUTO-ENTMCNC: 33.6 G/DL (ref 31–36)
MCV RBC AUTO: 96.5 FL (ref 80–100)
METHGB MFR BLDV: 0.3 %
MONOCYTES # BLD: 0.4 K/UL (ref 0–1.3)
MONOCYTES NFR BLD: 4.2 %
MUCOUS THREADS #/AREA URNS LPF: ABNORMAL /LPF
NEUTROPHILS # BLD: 7.4 K/UL (ref 1.7–7.7)
NEUTROPHILS NFR BLD: 85.2 %
NITRITE UR QL STRIP.AUTO: NEGATIVE
O2 CT VFR BLDV CALC: 13 VOL %
O2 THERAPY: ABNORMAL
PCO2 BLDV: 48.9 MMHG (ref 40–50)
PERFORMED ON: ABNORMAL
PH BLDV: 7.3 [PH] (ref 7.35–7.45)
PH UR STRIP.AUTO: 6 [PH] (ref 5–8)
PHOSPHATE SERPL-MCNC: 1.9 MG/DL (ref 2.5–4.9)
PLATELET # BLD AUTO: 176 K/UL (ref 135–450)
PMV BLD AUTO: 8.7 FL (ref 5–10.5)
PO2 BLDV: 40.8 MMHG (ref 25–40)
POTASSIUM SERPL-SCNC: 2.3 MMOL/L (ref 3.5–5.1)
PROT SERPL-MCNC: 3 G/DL (ref 6.4–8.2)
PROT UR STRIP.AUTO-MCNC: ABNORMAL MG/DL
RBC # BLD AUTO: 3.96 M/UL (ref 4.2–5.9)
RBC #/AREA URNS HPF: ABNORMAL /HPF (ref 0–4)
SALICYLATES SERPL-MCNC: <0.5 MG/DL (ref 15–30)
SAO2 % BLDV: 70 %
SODIUM SERPL-SCNC: 148 MMOL/L (ref 136–145)
SP GR UR STRIP.AUTO: >=1.03 (ref 1–1.03)
TROPONIN, HIGH SENSITIVITY: 12 NG/L (ref 0–22)
TROPONIN, HIGH SENSITIVITY: 9 NG/L (ref 0–22)
UA COMPLETE W REFLEX CULTURE PNL UR: ABNORMAL
UA DIPSTICK W REFLEX MICRO PNL UR: YES
URN SPEC COLLECT METH UR: ABNORMAL
UROBILINOGEN UR STRIP-ACNC: 0.2 E.U./DL
WBC # BLD AUTO: 8.7 K/UL (ref 4–11)
WBC #/AREA URNS HPF: ABNORMAL /HPF (ref 0–5)

## 2025-06-21 PROCEDURE — 80179 DRUG ASSAY SALICYLATE: CPT

## 2025-06-21 PROCEDURE — 84100 ASSAY OF PHOSPHORUS: CPT

## 2025-06-21 PROCEDURE — 80053 COMPREHEN METABOLIC PANEL: CPT

## 2025-06-21 PROCEDURE — 96374 THER/PROPH/DIAG INJ IV PUSH: CPT

## 2025-06-21 PROCEDURE — 6360000002 HC RX W HCPCS: Performed by: EMERGENCY MEDICINE

## 2025-06-21 PROCEDURE — 83735 ASSAY OF MAGNESIUM: CPT

## 2025-06-21 PROCEDURE — 86901 BLOOD TYPING SEROLOGIC RH(D): CPT

## 2025-06-21 PROCEDURE — 82803 BLOOD GASES ANY COMBINATION: CPT

## 2025-06-21 PROCEDURE — 70450 CT HEAD/BRAIN W/O DYE: CPT

## 2025-06-21 PROCEDURE — 36415 COLL VENOUS BLD VENIPUNCTURE: CPT

## 2025-06-21 PROCEDURE — 2580000003 HC RX 258: Performed by: EMERGENCY MEDICINE

## 2025-06-21 PROCEDURE — 99285 EMERGENCY DEPT VISIT HI MDM: CPT

## 2025-06-21 PROCEDURE — 1200000000 HC SEMI PRIVATE

## 2025-06-21 PROCEDURE — 6370000000 HC RX 637 (ALT 250 FOR IP): Performed by: EMERGENCY MEDICINE

## 2025-06-21 PROCEDURE — 70496 CT ANGIOGRAPHY HEAD: CPT

## 2025-06-21 PROCEDURE — 86900 BLOOD TYPING SEROLOGIC ABO: CPT

## 2025-06-21 PROCEDURE — 84484 ASSAY OF TROPONIN QUANT: CPT

## 2025-06-21 PROCEDURE — 6360000004 HC RX CONTRAST MEDICATION: Performed by: EMERGENCY MEDICINE

## 2025-06-21 PROCEDURE — 71045 X-RAY EXAM CHEST 1 VIEW: CPT

## 2025-06-21 PROCEDURE — 81001 URINALYSIS AUTO W/SCOPE: CPT

## 2025-06-21 PROCEDURE — 93005 ELECTROCARDIOGRAM TRACING: CPT | Performed by: EMERGENCY MEDICINE

## 2025-06-21 PROCEDURE — 86850 RBC ANTIBODY SCREEN: CPT

## 2025-06-21 PROCEDURE — 85025 COMPLETE CBC W/AUTO DIFF WBC: CPT

## 2025-06-21 PROCEDURE — 83605 ASSAY OF LACTIC ACID: CPT

## 2025-06-21 RX ORDER — ACETAMINOPHEN 325 MG/1
650 TABLET ORAL EVERY 6 HOURS PRN
Status: DISCONTINUED | OUTPATIENT
Start: 2025-06-21 | End: 2025-06-22 | Stop reason: HOSPADM

## 2025-06-21 RX ORDER — ENOXAPARIN SODIUM 100 MG/ML
40 INJECTION SUBCUTANEOUS DAILY
Status: DISCONTINUED | OUTPATIENT
Start: 2025-06-22 | End: 2025-06-22 | Stop reason: HOSPADM

## 2025-06-21 RX ORDER — DEXTROSE, SODIUM CHLORIDE, SODIUM LACTATE, POTASSIUM CHLORIDE, AND CALCIUM CHLORIDE 5; .6; .31; .03; .02 G/100ML; G/100ML; G/100ML; G/100ML; G/100ML
INJECTION, SOLUTION INTRAVENOUS CONTINUOUS
Status: DISCONTINUED | OUTPATIENT
Start: 2025-06-21 | End: 2025-06-22

## 2025-06-21 RX ORDER — ACETAMINOPHEN 650 MG/1
650 SUPPOSITORY RECTAL EVERY 6 HOURS PRN
Status: DISCONTINUED | OUTPATIENT
Start: 2025-06-21 | End: 2025-06-22 | Stop reason: HOSPADM

## 2025-06-21 RX ORDER — MAGNESIUM SULFATE IN WATER 40 MG/ML
2000 INJECTION, SOLUTION INTRAVENOUS ONCE
Status: COMPLETED | OUTPATIENT
Start: 2025-06-21 | End: 2025-06-22

## 2025-06-21 RX ORDER — POTASSIUM CHLORIDE 1500 MG/1
40 TABLET, EXTENDED RELEASE ORAL ONCE
Status: COMPLETED | OUTPATIENT
Start: 2025-06-21 | End: 2025-06-21

## 2025-06-21 RX ORDER — SODIUM CHLORIDE 9 MG/ML
1000 INJECTION, SOLUTION INTRAVENOUS CONTINUOUS
Status: DISCONTINUED | OUTPATIENT
Start: 2025-06-21 | End: 2025-06-21

## 2025-06-21 RX ORDER — POTASSIUM CHLORIDE 7.45 MG/ML
10 INJECTION INTRAVENOUS
Status: COMPLETED | OUTPATIENT
Start: 2025-06-21 | End: 2025-06-22

## 2025-06-21 RX ORDER — 0.9 % SODIUM CHLORIDE 0.9 %
1000 INTRAVENOUS SOLUTION INTRAVENOUS ONCE
Status: COMPLETED | OUTPATIENT
Start: 2025-06-21 | End: 2025-06-21

## 2025-06-21 RX ORDER — IOPAMIDOL 755 MG/ML
75 INJECTION, SOLUTION INTRAVASCULAR
Status: COMPLETED | OUTPATIENT
Start: 2025-06-21 | End: 2025-06-21

## 2025-06-21 RX ORDER — ONDANSETRON 2 MG/ML
4 INJECTION INTRAMUSCULAR; INTRAVENOUS EVERY 6 HOURS PRN
Status: DISCONTINUED | OUTPATIENT
Start: 2025-06-21 | End: 2025-06-22 | Stop reason: HOSPADM

## 2025-06-21 RX ADMIN — SODIUM CHLORIDE 1000 ML: 0.9 INJECTION, SOLUTION INTRAVENOUS at 21:52

## 2025-06-21 RX ADMIN — POTASSIUM CHLORIDE 10 MEQ: 7.45 INJECTION INTRAVENOUS at 22:42

## 2025-06-21 RX ADMIN — CALCIUM GLUCONATE 1000 MG: 98 INJECTION INTRAVENOUS at 23:00

## 2025-06-21 RX ADMIN — IOPAMIDOL 80 ML: 755 INJECTION, SOLUTION INTRAVENOUS at 23:14

## 2025-06-21 RX ADMIN — MAGNESIUM SULFATE HEPTAHYDRATE 2000 MG: 40 INJECTION, SOLUTION INTRAVENOUS at 22:26

## 2025-06-21 RX ADMIN — POTASSIUM CHLORIDE 40 MEQ: 1500 TABLET, EXTENDED RELEASE ORAL at 22:21

## 2025-06-21 ASSESSMENT — LIFESTYLE VARIABLES
HOW OFTEN DO YOU HAVE A DRINK CONTAINING ALCOHOL: NEVER
HOW MANY STANDARD DRINKS CONTAINING ALCOHOL DO YOU HAVE ON A TYPICAL DAY: PATIENT DOES NOT DRINK

## 2025-06-22 VITALS
DIASTOLIC BLOOD PRESSURE: 61 MMHG | HEIGHT: 60 IN | WEIGHT: 180 LBS | OXYGEN SATURATION: 99 % | RESPIRATION RATE: 15 BRPM | SYSTOLIC BLOOD PRESSURE: 125 MMHG | BODY MASS INDEX: 35.34 KG/M2 | HEART RATE: 59 BPM | TEMPERATURE: 97.8 F

## 2025-06-22 PROBLEM — N18.30 STAGE 3 CHRONIC KIDNEY DISEASE DUE TO TYPE 2 DIABETES MELLITUS (HCC): Status: ACTIVE | Noted: 2025-06-22

## 2025-06-22 PROBLEM — E66.01 MORBID (SEVERE) OBESITY DUE TO EXCESS CALORIES (HCC): Chronic | Status: ACTIVE | Noted: 2021-10-10

## 2025-06-22 PROBLEM — E66.9 OBESITY (BMI 30-39.9): Status: ACTIVE | Noted: 2025-06-22

## 2025-06-22 PROBLEM — N18.30 STAGE 3 CHRONIC KIDNEY DISEASE (HCC): Status: ACTIVE | Noted: 2025-06-22

## 2025-06-22 PROBLEM — E11.42 TYPE 2 DIABETES MELLITUS WITH DIABETIC POLYNEUROPATHY (HCC): Status: ACTIVE | Noted: 2025-06-22

## 2025-06-22 PROBLEM — E11.22 STAGE 3 CHRONIC KIDNEY DISEASE DUE TO TYPE 2 DIABETES MELLITUS (HCC): Status: ACTIVE | Noted: 2025-06-22

## 2025-06-22 PROBLEM — I12.9 HYPERTENSIVE CHRONIC KIDNEY DISEASE WITH STAGE 1 THROUGH STAGE 4 CHRONIC KIDNEY DISEASE, OR UNSPECIFIED CHRONIC KIDNEY DISEASE: Status: ACTIVE | Noted: 2023-06-07

## 2025-06-22 PROBLEM — E87.8 ELECTROLYTE IMBALANCE: Status: RESOLVED | Noted: 2025-06-21 | Resolved: 2025-06-22

## 2025-06-22 PROBLEM — E11.42 DIABETIC POLYNEUROPATHY (HCC): Chronic | Status: ACTIVE | Noted: 2019-01-14

## 2025-06-22 PROBLEM — E78.5 HYPERLIPIDEMIA: Status: ACTIVE | Noted: 2019-05-21

## 2025-06-22 PROBLEM — F12.90 MARIJUANA USER: Status: ACTIVE | Noted: 2025-06-22

## 2025-06-22 LAB
ALBUMIN SERPL-MCNC: 3.2 G/DL (ref 3.4–5)
ALBUMIN/GLOB SERPL: 1.3 {RATIO} (ref 1.1–2.2)
ALP SERPL-CCNC: 59 U/L (ref 40–129)
ALT SERPL-CCNC: 6 U/L (ref 10–40)
ANION GAP SERPL CALCULATED.3IONS-SCNC: 10 MMOL/L (ref 3–16)
ANION GAP SERPL CALCULATED.3IONS-SCNC: 11 MMOL/L (ref 3–16)
AST SERPL-CCNC: 12 U/L (ref 15–37)
BILIRUB SERPL-MCNC: 0.3 MG/DL (ref 0–1)
BUN SERPL-MCNC: 20 MG/DL (ref 7–20)
BUN SERPL-MCNC: 21 MG/DL (ref 7–20)
CA-I BLD-SCNC: 1.11 MMOL/L (ref 1.12–1.32)
CA-I BLD-SCNC: 1.13 MMOL/L (ref 1.12–1.32)
CALCIUM SERPL-MCNC: 8.5 MG/DL (ref 8.3–10.6)
CALCIUM SERPL-MCNC: 8.6 MG/DL (ref 8.3–10.6)
CHLORIDE SERPL-SCNC: 108 MMOL/L (ref 99–110)
CHLORIDE SERPL-SCNC: 108 MMOL/L (ref 99–110)
CO2 SERPL-SCNC: 21 MMOL/L (ref 21–32)
CO2 SERPL-SCNC: 21 MMOL/L (ref 21–32)
COHGB MFR BLD: 1.9 % (ref 0–1.5)
CREAT SERPL-MCNC: 1 MG/DL (ref 0.8–1.3)
CREAT SERPL-MCNC: 1 MG/DL (ref 0.8–1.3)
EKG ATRIAL RATE: 55 BPM
EKG DIAGNOSIS: NORMAL
EKG P AXIS: 55 DEGREES
EKG P-R INTERVAL: 132 MS
EKG Q-T INTERVAL: 488 MS
EKG QRS DURATION: 90 MS
EKG QTC CALCULATION (BAZETT): 466 MS
EKG R AXIS: 53 DEGREES
EKG T AXIS: 46 DEGREES
EKG VENTRICULAR RATE: 55 BPM
GFR SERPLBLD CREATININE-BSD FMLA CKD-EPI: 83 ML/MIN/{1.73_M2}
GFR SERPLBLD CREATININE-BSD FMLA CKD-EPI: 83 ML/MIN/{1.73_M2}
GLUCOSE BLD-MCNC: 134 MG/DL (ref 70–99)
GLUCOSE SERPL-MCNC: 106 MG/DL (ref 70–99)
GLUCOSE SERPL-MCNC: 152 MG/DL (ref 70–99)
LACTATE BLDV-SCNC: 0.6 MMOL/L (ref 0.4–2)
LACTATE BLDV-SCNC: 0.8 MMOL/L (ref 0.4–1.9)
MAGNESIUM SERPL-MCNC: 2.26 MG/DL (ref 1.8–2.4)
PERFORMED ON: ABNORMAL
PH BLDV: 7.38 [PH] (ref 7.35–7.45)
PH BLDV: 7.4 [PH] (ref 7.35–7.45)
POTASSIUM SERPL-SCNC: 5 MMOL/L (ref 3.5–5.1)
POTASSIUM SERPL-SCNC: 5.4 MMOL/L (ref 3.5–5.1)
PROT SERPL-MCNC: 5.7 G/DL (ref 6.4–8.2)
PTH-INTACT SERPL-MCNC: 72.4 PG/ML (ref 14–72)
SODIUM SERPL-SCNC: 139 MMOL/L (ref 136–145)
SODIUM SERPL-SCNC: 140 MMOL/L (ref 136–145)

## 2025-06-22 PROCEDURE — 2580000003 HC RX 258: Performed by: STUDENT IN AN ORGANIZED HEALTH CARE EDUCATION/TRAINING PROGRAM

## 2025-06-22 PROCEDURE — 80053 COMPREHEN METABOLIC PANEL: CPT

## 2025-06-22 PROCEDURE — 6360000002 HC RX W HCPCS: Performed by: STUDENT IN AN ORGANIZED HEALTH CARE EDUCATION/TRAINING PROGRAM

## 2025-06-22 PROCEDURE — 6370000000 HC RX 637 (ALT 250 FOR IP): Performed by: STUDENT IN AN ORGANIZED HEALTH CARE EDUCATION/TRAINING PROGRAM

## 2025-06-22 PROCEDURE — 82330 ASSAY OF CALCIUM: CPT

## 2025-06-22 PROCEDURE — 6360000002 HC RX W HCPCS: Performed by: EMERGENCY MEDICINE

## 2025-06-22 PROCEDURE — 83970 ASSAY OF PARATHORMONE: CPT

## 2025-06-22 PROCEDURE — 36415 COLL VENOUS BLD VENIPUNCTURE: CPT

## 2025-06-22 PROCEDURE — 82375 ASSAY CARBOXYHB QUANT: CPT

## 2025-06-22 PROCEDURE — 83735 ASSAY OF MAGNESIUM: CPT

## 2025-06-22 PROCEDURE — 93010 ELECTROCARDIOGRAM REPORT: CPT | Performed by: INTERNAL MEDICINE

## 2025-06-22 PROCEDURE — 83605 ASSAY OF LACTIC ACID: CPT

## 2025-06-22 RX ORDER — LOSARTAN POTASSIUM 100 MG/1
100 TABLET ORAL DAILY
Status: DISCONTINUED | OUTPATIENT
Start: 2025-06-22 | End: 2025-06-22 | Stop reason: HOSPADM

## 2025-06-22 RX ORDER — SODIUM CHLORIDE 9 MG/ML
INJECTION, SOLUTION INTRAVENOUS CONTINUOUS
Status: DISCONTINUED | OUTPATIENT
Start: 2025-06-22 | End: 2025-06-22 | Stop reason: HOSPADM

## 2025-06-22 RX ORDER — ASPIRIN 81 MG/1
81 TABLET ORAL DAILY
Status: DISCONTINUED | OUTPATIENT
Start: 2025-06-22 | End: 2025-06-22 | Stop reason: HOSPADM

## 2025-06-22 RX ORDER — INSULIN LISPRO 100 [IU]/ML
0-4 INJECTION, SOLUTION INTRAVENOUS; SUBCUTANEOUS
Status: DISCONTINUED | OUTPATIENT
Start: 2025-06-22 | End: 2025-06-22 | Stop reason: HOSPADM

## 2025-06-22 RX ORDER — ATORVASTATIN CALCIUM 10 MG/1
20 TABLET, FILM COATED ORAL DAILY
Status: DISCONTINUED | OUTPATIENT
Start: 2025-06-22 | End: 2025-06-22 | Stop reason: HOSPADM

## 2025-06-22 RX ORDER — DEXTROSE MONOHYDRATE 100 MG/ML
INJECTION, SOLUTION INTRAVENOUS CONTINUOUS PRN
Status: DISCONTINUED | OUTPATIENT
Start: 2025-06-22 | End: 2025-06-22 | Stop reason: HOSPADM

## 2025-06-22 RX ORDER — GLUCAGON 1 MG/ML
1 KIT INJECTION PRN
Status: DISCONTINUED | OUTPATIENT
Start: 2025-06-22 | End: 2025-06-22 | Stop reason: HOSPADM

## 2025-06-22 RX ADMIN — ASPIRIN 81 MG: 81 TABLET, COATED ORAL at 11:54

## 2025-06-22 RX ADMIN — POTASSIUM CHLORIDE 10 MEQ: 7.45 INJECTION INTRAVENOUS at 02:59

## 2025-06-22 RX ADMIN — DIBASIC SODIUM PHOSPHATE, MONOBASIC POTASSIUM PHOSPHATE AND MONOBASIC SODIUM PHOSPHATE 1 TABLET: 852; 155; 130 TABLET ORAL at 01:28

## 2025-06-22 RX ADMIN — ATORVASTATIN CALCIUM 20 MG: 10 TABLET, FILM COATED ORAL at 11:55

## 2025-06-22 RX ADMIN — SODIUM CHLORIDE: 0.9 INJECTION, SOLUTION INTRAVENOUS at 05:47

## 2025-06-22 RX ADMIN — POTASSIUM BICARBONATE 50 MEQ: 977.5 TABLET, EFFERVESCENT ORAL at 00:21

## 2025-06-22 RX ADMIN — POTASSIUM CHLORIDE 10 MEQ: 7.45 INJECTION INTRAVENOUS at 01:30

## 2025-06-22 RX ADMIN — SODIUM CHLORIDE, SODIUM LACTATE, POTASSIUM CHLORIDE, CALCIUM CHLORIDE AND DEXTROSE MONOHYDRATE: 5; 600; 310; 30; 20 INJECTION, SOLUTION INTRAVENOUS at 00:23

## 2025-06-22 RX ADMIN — MAGNESIUM SULFATE HEPTAHYDRATE 2000 MG: 40 INJECTION, SOLUTION INTRAVENOUS at 01:35

## 2025-06-22 NOTE — ED NOTES
Patient adds to triage that he had some bloody stool this morning. Patient is currently refusing rectal exam to be completed by MD. Denies ABD pain

## 2025-06-22 NOTE — ED NOTES
----- Message from Kell Angeles sent at 11/8/2017 10:15 AM CST -----  Contact: wife, Amber Melchor  Patient's wifeAmber Melchor would like an appointment for patient with nurse for an injection for a sciatic. Please call patient's wife at [phone]. Thanks!   Discharge instructions reviewed with patient and family member.  Patient and family verbalized understanding.  All home medications have been reviewed, questions answered and patient voiced understanding. Given prescriptions, discharge instructions, and appointment times.

## 2025-06-22 NOTE — ED NOTES
Giancarlo Ferrell is a 64 y.o. male admitted for  Principal Problem:    Electrolyte imbalance  Resolved Problems:    * No resolved hospital problems. *  .   Patient Home via family with   Chief Complaint   Patient presents with    Extremity Weakness     Patient arrived to ED with c/o generalized weakness, dizziness, N/V, and SOB that started when he woke up this morning. Hx. DM 2 have not had anything to eat or drink today. BG in route 142. Patient denies CP. Bradycardia and A.fib noted upon arrival. +81 asa daily. Alert and oriented x4.+Pale   .  Patient is alert and Person, Place, Time, and Situation  Patient's baseline mobility: Baseline Mobility: Independent (standby)  Code Status: Prior   Cardiac Rhythm:       Is patient on baseline Oxygen: no how many Liters:   Abnormal Assessment Findings:     Isolation: None      NIH Score:    C-SSRS: Risk of Suicide: No Risk  Bedside swallow:        Active LDA's:   Peripheral IV 06/21/25 Right Antecubital (Active)   Site Assessment Clean, dry & intact 06/21/25 2108   Line Status Specimen collected 06/21/25 2108   Line Care Connections checked and tightened 06/21/25 2108   Phlebitis Assessment No symptoms 06/21/25 2108   Infiltration Assessment 0 06/21/25 2108   Dressing Status Clean, dry & intact 06/21/25 2108   Dressing Type Transparent 06/21/25 2108   Dressing Intervention New 06/21/25 2108       Peripheral IV 06/21/25 Left Antecubital (Active)   Site Assessment Clean, dry & intact 06/21/25 2237   Line Status Blood return noted 06/21/25 2237       Peripheral IV 06/21/25 Posterior;Right Hand (Active)   Site Assessment Clean, dry & intact 06/21/25 2248   Dressing Intervention New 06/21/25 2248     Patient admitted with a zeng: no If the zeng is chronic was it exchanged:No  Reason for zeng:   Patient admitted with Central Line:  . PICC line placement confirmed: YES OR NO:[id number]}   Reason for Central line:   Was central line Inserted from an outside facility:
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
## 2025-06-22 NOTE — PROGRESS NOTES
Shift assessment is complete. The pt is A&O x 4, noted Rt lower leg vascular discoloration with skin tear. Family member stated that patient had scratch by the cat on his leg. The pt denies any further needs at this time. The pt call light and personal items are with in reach. The bed alarm is on. Will continue to monitor.
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
## (undated) DEVICE — BLADE SURG SAW S STL NAR OSC W/ SERR EDGE DISP

## (undated) DEVICE — PIN BNE FIX L140MM DIA3.2MM SELF DRL

## (undated) DEVICE — CUTTER SURG OD38MM PAT KNEE DISP FOR RM SYS XCELERATE

## (undated) DEVICE — BOOT POS LEG DEMAYO

## (undated) DEVICE — SURGICAL PROCEDURE PACK RESTORIS MCK CAPMAKOPFJ] MAKO]

## (undated) DEVICE — CORD RETRCT SIL

## (undated) DEVICE — SOLUTION IV IRRIG POUR BRL 0.9% SODIUM CHL 2F7124

## (undated) DEVICE — GLOVE SURG SZ 65 THK91MIL LTX FREE SYN POLYISOPRENE

## (undated) DEVICE — SKIN AFFIX SURG ADHESIVE 72/CS 0.55ML: Brand: MEDLINE

## (undated) DEVICE — 19 IN KNEE IMMOBILIZER: Brand: DEROYAL

## (undated) DEVICE — ZIMMER® STERILE DISPOSABLE TOURNIQUET CUFF WITH PROTECTIVE SLEEVE AND PLC, DUAL PORT, SINGLE BLADDER, 30 IN. (76 CM)

## (undated) DEVICE — KIT DRP FOR RIO ROBOTIC ARM ASST SYS

## (undated) DEVICE — 35 ML SYRINGE LUER-LOCK TIP: Brand: MONOJECT

## (undated) DEVICE — Device

## (undated) DEVICE — GLOVE ORANGE PI 7 1/2   MSG9075

## (undated) DEVICE — PENCIL SMK EVAC ALL IN 1 DSGN ENH VISIBILITY IMPROVED AIR

## (undated) DEVICE — DRESSING FOAM W4XL10IN SIL RECT ADH WTRPRF FLM BK W/ BORD

## (undated) DEVICE — PIN FIX L140MM DIA4MM BNE

## (undated) DEVICE — HANDPIECE SET WITH HIGH FLOW TIP AND SUCTION TUBE: Brand: INTERPULSE

## (undated) DEVICE — GLOVE ORANGE PI 8   MSG9080

## (undated) DEVICE — DRESSING FOAM W4XL12IN SIL RECT ADH WTRPRF FLM BK W/ BORD

## (undated) DEVICE — BLADE SURG SAW STD S STL OSC W/ SERR EDGE DISP

## (undated) DEVICE — KIT TRK KNEE PROC VIZADISC

## (undated) DEVICE — KIT INT FIX FEM TIB CKPT MAKOPLASTY

## (undated) DEVICE — STERILE PVP: Brand: MEDLINE INDUSTRIES, INC.

## (undated) DEVICE — PEEL-AWAY TOGA, 2X LARGE: Brand: FLYTE

## (undated) DEVICE — GLOVE ORANGE PI 8 1/2   MSG9085

## (undated) DEVICE — PIN FIX L170MM DIA4MM BNE SELF DRL

## (undated) DEVICE — PIN BONE FIX L110MM DIA3.2MM

## (undated) DEVICE — SOLUTION IRRIG 2000ML 0.9% SOD CHL USP UROMATIC PLAS CONT

## (undated) DEVICE — HOOD, PEEL-AWAY: Brand: FLYTE